# Patient Record
Sex: FEMALE | Race: WHITE | NOT HISPANIC OR LATINO | Employment: UNEMPLOYED | URBAN - NONMETROPOLITAN AREA
[De-identification: names, ages, dates, MRNs, and addresses within clinical notes are randomized per-mention and may not be internally consistent; named-entity substitution may affect disease eponyms.]

---

## 2022-06-14 ENCOUNTER — OFFICE VISIT (OUTPATIENT)
Dept: PSYCHIATRY | Facility: CLINIC | Age: 32
End: 2022-06-14

## 2022-06-14 VITALS
SYSTOLIC BLOOD PRESSURE: 118 MMHG | WEIGHT: 149 LBS | BODY MASS INDEX: 27.42 KG/M2 | HEIGHT: 62 IN | HEART RATE: 68 BPM | DIASTOLIC BLOOD PRESSURE: 78 MMHG

## 2022-06-14 DIAGNOSIS — F41.1 GENERALIZED ANXIETY DISORDER: ICD-10-CM

## 2022-06-14 DIAGNOSIS — F33.1 MODERATE EPISODE OF RECURRENT MAJOR DEPRESSIVE DISORDER: Primary | ICD-10-CM

## 2022-06-14 DIAGNOSIS — G47.09 OTHER INSOMNIA: ICD-10-CM

## 2022-06-14 PROCEDURE — 90792 PSYCH DIAG EVAL W/MED SRVCS: CPT | Performed by: NURSE PRACTITIONER

## 2022-06-14 RX ORDER — PRAMIPEXOLE DIHYDROCHLORIDE 0.25 MG/1
0.25 TABLET ORAL
COMMUNITY
Start: 2022-05-28 | End: 2022-08-22

## 2022-06-14 RX ORDER — HYDROCHLOROTHIAZIDE 12.5 MG/1
12.5 CAPSULE, GELATIN COATED ORAL DAILY
COMMUNITY
End: 2022-08-22

## 2022-06-14 RX ORDER — IBUPROFEN 800 MG/1
TABLET ORAL
COMMUNITY
Start: 2022-05-30

## 2022-06-14 RX ORDER — DULOXETIN HYDROCHLORIDE 60 MG/1
120 CAPSULE, DELAYED RELEASE ORAL DAILY
Qty: 60 CAPSULE | Refills: 2 | Status: SHIPPED | OUTPATIENT
Start: 2022-06-14

## 2022-06-14 RX ORDER — HYOSCYAMINE SULFATE 0.12 MG/5ML
125 LIQUID ORAL EVERY 4 HOURS PRN
COMMUNITY

## 2022-06-14 RX ORDER — LORATADINE 10 MG/1
10 TABLET ORAL DAILY
COMMUNITY
Start: 2022-05-25 | End: 2022-07-19 | Stop reason: ALTCHOICE

## 2022-06-14 RX ORDER — CLONIDINE HYDROCHLORIDE 0.1 MG/1
0.1 TABLET ORAL NIGHTLY
Qty: 30 TABLET | Refills: 2 | Status: SHIPPED | OUTPATIENT
Start: 2022-06-14 | End: 2022-07-19 | Stop reason: SDUPTHER

## 2022-06-14 RX ORDER — DULOXETIN HYDROCHLORIDE 30 MG/1
30 CAPSULE, DELAYED RELEASE ORAL DAILY
COMMUNITY
Start: 2022-04-26 | End: 2022-06-14 | Stop reason: DRUGHIGH

## 2022-06-14 RX ORDER — PROPRANOLOL HYDROCHLORIDE 10 MG/1
10 TABLET ORAL 2 TIMES DAILY
COMMUNITY
Start: 2022-03-31 | End: 2022-06-14 | Stop reason: SINTOL

## 2022-06-14 RX ORDER — DULOXETIN HYDROCHLORIDE 60 MG/1
60 CAPSULE, DELAYED RELEASE ORAL DAILY
COMMUNITY
Start: 2022-06-01 | End: 2022-06-14 | Stop reason: SDUPTHER

## 2022-06-14 RX ORDER — DIVALPROEX SODIUM 250 MG/1
750 TABLET, EXTENDED RELEASE ORAL
COMMUNITY
Start: 2022-05-24

## 2022-06-14 RX ORDER — CARBAMAZEPINE 100 MG/1
100 CAPSULE, EXTENDED RELEASE ORAL EVERY 12 HOURS
COMMUNITY
Start: 2022-05-27
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
## 2022-06-22 ENCOUNTER — TELEPHONE (OUTPATIENT)
Dept: PSYCHIATRY | Facility: CLINIC | Age: 32
End: 2022-06-22

## 2022-06-22 NOTE — TELEPHONE ENCOUNTER
Ruby called and said that you had referred her to neurology, but that she has her EEG paperwork from the beginning of January and February that she can bring. She wanted to know if she still needed to be seen by neurology? Thanks!

## 2022-07-19 ENCOUNTER — OFFICE VISIT (OUTPATIENT)
Dept: PSYCHIATRY | Facility: CLINIC | Age: 32
End: 2022-07-19

## 2022-07-19 VITALS
WEIGHT: 138.5 LBS | DIASTOLIC BLOOD PRESSURE: 68 MMHG | BODY MASS INDEX: 25.49 KG/M2 | SYSTOLIC BLOOD PRESSURE: 114 MMHG | HEART RATE: 86 BPM | HEIGHT: 62 IN

## 2022-07-19 DIAGNOSIS — F33.1 MODERATE EPISODE OF RECURRENT MAJOR DEPRESSIVE DISORDER: Primary | Chronic | ICD-10-CM

## 2022-07-19 DIAGNOSIS — G47.09 OTHER INSOMNIA: Chronic | ICD-10-CM

## 2022-07-19 DIAGNOSIS — F41.1 GENERALIZED ANXIETY DISORDER: Chronic | ICD-10-CM

## 2022-07-19 PROCEDURE — 99214 OFFICE O/P EST MOD 30 MIN: CPT | Performed by: NURSE PRACTITIONER

## 2022-07-19 RX ORDER — CLONIDINE HYDROCHLORIDE 0.1 MG/1
0.2 TABLET ORAL NIGHTLY
Qty: 60 TABLET | Refills: 2 | Status: SHIPPED | OUTPATIENT
Start: 2022-07-19

## 2022-07-19 RX ORDER — ADAPALENE 0.3 %
GEL (GRAM) TOPICAL
COMMUNITY
Start: 2022-06-21

## 2022-07-19 RX ORDER — LEVOCETIRIZINE DIHYDROCHLORIDE 5 MG
5 TABLET ORAL EVERY EVENING
COMMUNITY
Start: 2022-06-16

## 2022-07-19 RX ORDER — POLYETHYLENE GLYCOL 3350 17 G
POWDER IN PACKET (EA) ORAL
COMMUNITY
Start: 2022-06-16

## 2022-07-19 RX ORDER — DOXYCYCLINE HYCLATE 50 MG/1
50 CAPSULE ORAL EVERY 12 HOURS
COMMUNITY
Start: 2022-07-13

## 2022-07-19 RX ORDER — NALTREXONE HYDROCHLORIDE 50 MG/1
50 TABLET, FILM COATED ORAL DAILY
COMMUNITY
Start: 2022-06-20

## 2022-07-19 NOTE — PROGRESS NOTES
"Chief Complaint  Anxiety, Depression, and Sleeping Problem    Subjective          Ruby Barakat presents to [facility] for medication management of her anxiety, depression, sleeping difficulties.    History of Present Illness: Patient presents today for follow-up appointment after last being seen for initial evaluation on 06/14/2022.  Patient reports today \"I am doing all right\".  She says the increase in Cymbalta at her last appointment has been very beneficial, however she sometimes still feels \"off\".  She reports she still can have frequent mood swings, and go from periods where she feels she is doing well, to suddenly feeling that things are not going okay.  Patient says she can be frustrated with this, but feels it is largely due to her environment.  Patient reports she is thinking about switching to a different rehabilitation facility.  She is not court ordered to be at [facility], and feels another facility may be better for her overall.  Patient says she is looking into either [facility] or [facility].  Patient says the clonidine has also been beneficial for her sleep, but says she is still waking up multiple times a night.  Patient has continued to take her other medications and has an upcoming appointment with neurology next month.  She is looking forward to that appointment because she does not want to continue taking epilepsy medication if she does not need to.  Patient reports her appetite has been good and she has no concerns.  Patient denies any SI/HI, A/V hallucinations.    Current Medications:   Current Outpatient Medications   Medication Sig Dispense Refill   • carBAMazepine (CARBATROL) 100 MG 12 hr capsule Take 100 mg by mouth Every 12 (Twelve) Hours.     • cloNIDine (Catapres) 0.1 MG tablet Take 2 tablets by mouth Every Night. 60 tablet 2   • [employer] Allergy Relief 5 MG tablet Take 5 mg by mouth Every Evening.     • Differin 0.3 % gel APPLY BY TOPICAL ROUTE EVERY DAY " "A THIN LAYER TO THE AFFECTED AREA(S) AFTER WASHING IN THE EVENING     • divalproex (DEPAKOTE ER) 250 MG 24 hr tablet Take 750 mg by mouth every night at bedtime.     • doxycycline (VIBRAMYCIN) 50 MG capsule Take 50 mg by mouth Every 12 (Twelve) Hours.     • DULoxetine (CYMBALTA) 60 MG capsule Take 2 capsules by mouth Daily. 60 capsule 2   • hydroCHLOROthiazide (MICROZIDE) 12.5 MG capsule Take 12.5 mg by mouth Daily.     • hyoscyamine (LEVSIN) 0.125 MG/5ML elixir Take 125 mcg by mouth Every 4 (Four) Hours As Needed for Bladder Spasms.     • ibuprofen (ADVIL,MOTRIN) 800 MG tablet TAKE 1 TABLET BY MOUTH TWICE A DAY WITH FOOD AS NEEDED     • naltrexone (DEPADE) 50 MG tablet Take 50 mg by mouth Daily.     • pramipexole (MIRAPEX) 0.25 MG tablet Take 0.25 mg by mouth every night at bedtime.     • nicotine polacrilex (COMMIT) 4 MG lozenge DISSOLVE 1 LOZENGE BY MOUTH EVERY 2-4 HOURS AS NEEDED       No current facility-administered medications for this visit.       Mental Status Exam:   Hygiene:   good  Cooperation:  Cooperative  Eye Contact:  Good  Psychomotor Behavior:  Appropriate  Affect:  Appropriate  Mood: euthymic  Speech:  Normal  Thought Process:  Goal directed  Thought Content:  Mood congruent  Suicidal:  None  Homicidal:  None  Hallucinations:  None  Delusion:  None  Memory:  Intact  Orientation:  Person, Place, Time and Situation  Reliability:  good  Insight:  Good  Judgement:  Good  Impulse Control:  Good  Physical/Medical Issues:  RLS, possible seizure disorder       Objective   Vital Signs:   /68   Pulse 86   Ht 157.5 cm (62\")   Wt 62.8 kg (138 lb 8 oz)   BMI 25.33 kg/m²     Physical Exam  Neurological:      Mental Status: She is oriented to person, place, and time. Mental status is at baseline.      Coordination: Coordination is intact.      Gait: Gait is intact.   Psychiatric:         Behavior: Behavior is cooperative.        Result Review :     The following data was reviewed by: Adele Montes De Oca, " APRN on 07/19/2022:    Data reviewed: Previous note, medication history          Assessment and Plan    Problem List Items Addressed This Visit    None     Visit Diagnoses     Moderate episode of recurrent major depressive disorder (HCC)  (Chronic)   -  Primary    Improving    Relevant Medications    nicotine polacrilex (COMMIT) 4 MG lozenge    Generalized anxiety disorder  (Chronic)       Improving    Relevant Medications    nicotine polacrilex (COMMIT) 4 MG lozenge    Other insomnia  (Chronic)       Unchanged    Relevant Medications    cloNIDine (Catapres) 0.1 MG tablet          PHQ-9 Score:   PHQ-9 Total Score: 13      Depression Screening:  Patient screened positive for depression based on a PHQ-9 score of 13 on 7/19/2022. Follow-up recommendations include: Prescribed antidepressant medication treatment and Suicide Risk Assessment performed.        Tobacco Cessation:  Ruby Barakat  reports that she has been smoking cigarettes. She has been smoking about 0.25 packs per day. She has never used smokeless tobacco.. I have educated her on the risk of diseases from using tobacco products such as cancer, COPD and heart disease.     I advised her to quit and she is not willing to quit.    I spent 3  minutes counseling the patient.       Impression/Plan:  -This is my first follow-up appointment with patient.  Patient presents today and reports she has been doing better than she was at her initial evaluation.  She feels the medication changes made at her previous appointment have been beneficial to her.  She endorses taking her medication as prescribed, and denies any adverse effects associated with them.  Patient feels while she is doing better overall, she does continue to struggle, especially with sleep.  Discussed increasing her clonidine and the patient would be open to this.  -Maintain Cymbalta 120 mg daily.  -Increase clonidine to 0.2 mg nightly.  -Schedule follow-up appointment for 2 months or as needed.    MEDS  ORDERED DURING VISIT:  New Medications Ordered This Visit   Medications   • cloNIDine (Catapres) 0.1 MG tablet     Sig: Take 2 tablets by mouth Every Night.     Dispense:  60 tablet     Refill:  2         Follow Up   Return in about 2 months (around 9/19/2022), or if symptoms worsen or fail to improve, for Next scheduled follow up.  Patient was given instructions and counseling regarding her condition or for health maintenance advice. Please see specific information pulled into the AVS if appropriate.       TREATMENT PLAN/GOALS: Continue supportive psychotherapy efforts and medications as indicated. Treatment and medication options discussed during today's visit. Patient acknowledged and verbally consented to continue with current treatment plan and was educated on the importance of compliance with treatment and follow-up appointments.    MEDICATION ISSUES:  Discussed medication options and treatment plan of prescribed medication as well as the risks, benefits, and side effects including potential falls, possible impaired driving and metabolic adversities among others. Patient is agreeable to call the office with any worsening of symptoms or onset of side effects. Patient is agreeable to call 911 or go to the nearest ER should he/she begin having SI/HI.          This document has been electronically signed by VIRGILIO Acevedo, PMHNP-BC  July 19, 2022 14:56 EDT      Part of this note may be an electronic transcription/translation of spoken language to printed text using the Dragon Dictation System.

## 2022-08-04 ENCOUNTER — TELEPHONE (OUTPATIENT)
Dept: PSYCHIATRY | Facility: CLINIC | Age: 32
End: 2022-08-04

## 2022-08-04 NOTE — TELEPHONE ENCOUNTER
Ruby states she is no longer staying at [facility]. States it was discussed that mediation's while staying at [facility] was limited. Now that she is no longer staying there she would like to know if you would prescribe her medication for sleep and anxiety. Please advise

## 2022-08-22 ENCOUNTER — OFFICE VISIT (OUTPATIENT)
Dept: NEUROLOGY | Facility: CLINIC | Age: 32
End: 2022-08-22

## 2022-08-22 VITALS
HEART RATE: 105 BPM | BODY MASS INDEX: 26.87 KG/M2 | TEMPERATURE: 97.8 F | HEIGHT: 62 IN | DIASTOLIC BLOOD PRESSURE: 80 MMHG | WEIGHT: 146 LBS | SYSTOLIC BLOOD PRESSURE: 140 MMHG | OXYGEN SATURATION: 95 %

## 2022-08-22 DIAGNOSIS — F10.11 HISTORY OF ALCOHOL ABUSE: ICD-10-CM

## 2022-08-22 DIAGNOSIS — G62.89 OTHER POLYNEUROPATHY: ICD-10-CM

## 2022-08-22 DIAGNOSIS — F39 MOOD DISORDER: ICD-10-CM

## 2022-08-22 DIAGNOSIS — R56.9 WITNESSED SEIZURE-LIKE ACTIVITY: Primary | ICD-10-CM

## 2022-08-22 PROCEDURE — 99214 OFFICE O/P EST MOD 30 MIN: CPT | Performed by: NURSE PRACTITIONER

## 2022-08-22 RX ORDER — PREGABALIN 50 MG/1
50 CAPSULE ORAL 3 TIMES DAILY
Qty: 90 CAPSULE | Refills: 1 | Status: SHIPPED | OUTPATIENT
Start: 2022-08-22

## 2022-08-22 RX ORDER — CYCLOBENZAPRINE HCL 10 MG
TABLET ORAL
COMMUNITY
Start: 2022-07-24

## 2022-08-22 RX ORDER — PREGABALIN 50 MG/1
50 CAPSULE ORAL 3 TIMES DAILY
Qty: 90 CAPSULE | Refills: 1 | Status: SHIPPED | OUTPATIENT
Start: 2022-08-22 | End: 2022-08-22 | Stop reason: SDUPTHER

## 2022-08-22 RX ORDER — BUSPIRONE HYDROCHLORIDE 5 MG/1
5 TABLET ORAL 2 TIMES DAILY
COMMUNITY
Start: 2022-08-11

## 2022-08-22 NOTE — PROGRESS NOTES
New Patient Office Visit      Patient Name: Ruby Barakat  : 1990   MRN: [id number]     Referring Physician: Adele Montes De Oca A*    Chief Complaint:    Chief Complaint   Patient presents with   • Consult     Np, in office to establish care for seizures. Patient states her first sz was in January the last seizure she had was in February. Patient states she thinks they were alcohol induced seizures.        History of Present Illness: Ruby Barakat is a 32 y.o. female who is here today to establish care with Neurology for seizures.  She had seizure-like activity when she was inpatient and withdrawing from alcohol.  She denies any history of seizure disorder.  She is taking Depakote ER 750mg QHS and Carbamazepine 100mg BID- reports good compliance and toleration. She says she was prescribed these medications while she was inpatient.  She denies any history of head injury.  She did not graduate from High School but did rodrigo her GED.  She was in regular classes while in High School.  She was the product of a normal, full-term birth.  Additional risk factors- BMI 26, mood disorder, pituitary tumor removal , chronic alcohol use for 16 years- sober for 6 months.     Peripheral neuropathy- Diagnosed with this a couple of years ago.  Has sharp-burning pains, numbness and tingling in her feet and legs.  Symptoms were much worse when she was drinking heavily.  She was previously on Lyrica for her symptoms and it did help.  On 2022 vitamin B12 and folate were normal.     Pertinent Medical History:  On 2017 MRI brain with and without contrast showed-   Surgical changes status post transsphenoidal pituitary resection. The infundibulum is midline and is not thickened. There is residual normal enhancing pituitary tissue along the dorsal and lateral aspects of the sella. There is no definite evidence of hypoenhancing residual tumor. The optic chiasm, cavernous sinuses, and hypothalamus are unremarkable.      There is no abnormal brain parenchymal signal. There is no abnormal intra-axial postcontrast enhancement. The ventricles are within normal limits. The basilar cisterns are patent. There is no cerebellar tonsillar herniation.     Diffusion imaging shows no evidence of acute infarction or other acute abnormality.     There are no suspicious osseous lesions.    Subjective      Review of Systems:   Review of Systems   Constitutional: Negative for fatigue, fever, unexpected weight gain and unexpected weight loss.   HENT: Positive for sneezing. Negative for hearing loss, sore throat, swollen glands, tinnitus and trouble swallowing.    Eyes: Negative for blurred vision, double vision, photophobia and visual disturbance.   Respiratory: Negative for cough, chest tightness and shortness of breath.    Cardiovascular: Negative for chest pain, palpitations and leg swelling.   Gastrointestinal: Positive for abdominal pain. Negative for constipation, diarrhea and nausea.   Endocrine: Negative for cold intolerance and heat intolerance.   Musculoskeletal: Negative for gait problem, neck pain and neck stiffness.   Skin: Negative for color change and rash.   Allergic/Immunologic: Negative for environmental allergies and food allergies.   Neurological: Positive for seizures and numbness. Negative for dizziness, syncope, facial asymmetry, speech difficulty, weakness, headache, memory problem and confusion.   Psychiatric/Behavioral: Negative for agitation, behavioral problems and depressed mood. The patient is not nervous/anxious.        Past Medical History:   Past Medical History:   Diagnosis Date   • Alcohol abuse    • Anxiety    • Depression        Past Surgical History:   Past Surgical History:   Procedure Laterality Date   • BRAIN TUMOR EXCISION N/A 2006       Family History:   Family History   Problem Relation Age of Onset   • Alcohol abuse Maternal Grandfather        Social History:   Social History     Socioeconomic History    • Marital status: Single   Tobacco Use   • Smoking status: Former Smoker     Packs/day: 0.25     Types: Cigarettes     Quit date: 2022     Years since quittin.0   • Smokeless tobacco: Never Used   Vaping Use   • Vaping Use: Every day   • Start date: 2022   • Substances: Nicotine   • Devices: RefThe Influenceble tank   Substance and Sexual Activity   • Alcohol use: Not Currently   • Drug use: Not Currently     Types: Marijuana, LSD, Other     Comment: extasy, oscar   • Sexual activity: Defer       Medications:     Current Outpatient Medications:   •  busPIRone (BUSPAR) 5 MG tablet, Take 5 mg by mouth 2 (Two) Times a Day., Disp: , Rfl:   •  carBAMazepine (CARBATROL) 100 MG 12 hr capsule, Take 100 mg by mouth Every 12 (Twelve) Hours., Disp: , Rfl:   •  [employer] Allergy Relief 5 MG tablet, Take 5 mg by mouth Every Evening., Disp: , Rfl:   •  cyclobenzaprine (FLEXERIL) 10 MG tablet, TAKE ONE-HALF TO ONE TABLET BY MOUTH TWICE DAILY - DOCTOR NOT COVERED, Disp: , Rfl:   •  Differin 0.3 % gel, APPLY BY TOPICAL ROUTE EVERY DAY A THIN LAYER TO THE AFFECTED AREA(S) AFTER WASHING IN THE EVENING, Disp: , Rfl:   •  divalproex (DEPAKOTE ER) 250 MG 24 hr tablet, Take 750 mg by mouth every night at bedtime., Disp: , Rfl:   •  doxycycline (VIBRAMYCIN) 50 MG capsule, Take 50 mg by mouth Every 12 (Twelve) Hours., Disp: , Rfl:   •  DULoxetine (CYMBALTA) 60 MG capsule, Take 2 capsules by mouth Daily., Disp: 60 capsule, Rfl: 2  •  hyoscyamine (LEVSIN) 0.125 MG/5ML elixir, Take 125 mcg by mouth Every 4 (Four) Hours As Needed for Bladder Spasms., Disp: , Rfl:   •  ibuprofen (ADVIL,MOTRIN) 800 MG tablet, TAKE 1 TABLET BY MOUTH TWICE A DAY WITH FOOD AS NEEDED, Disp: , Rfl:   •  naltrexone (DEPADE) 50 MG tablet, Take 50 mg by mouth Daily., Disp: , Rfl:   •  nicotine polacrilex (COMMIT) 4 MG lozenge, DISSOLVE 1 LOZENGE BY MOUTH EVERY 2-4 HOURS AS NEEDED, Disp: , Rfl:   •  pregabalin (Lyrica) 50 MG capsule, Take 1 capsule by mouth 3 (Three)  "Times a Day., Disp: 90 capsule, Rfl: 1  •  cloNIDine (Catapres) 0.1 MG tablet, Take 2 tablets by mouth Every Night., Disp: 60 tablet, Rfl: 2    Allergies:   Allergies   Allergen Reactions   • Demerol [Meperidine] Hives       Objective     Physical Exam:  Vital Signs:   Vitals:    08/22/22 1122   BP: 140/80   BP Location: Right arm   Patient Position: Sitting   Cuff Size: Adult   Pulse: 105   Temp: 97.8 °F (36.6 °C)   SpO2: 95%   Weight: 66.2 kg (146 lb)   Height: 157.5 cm (62\")   PainSc:   7   PainLoc: Leg  Comment: both legs from the knee down     Body mass index is 26.7 kg/m².     Physical Exam  Vitals and nursing note reviewed.   Constitutional:       General: She is not in acute distress.     Appearance: Normal appearance. She is well-developed and normal weight. She is not diaphoretic.   HENT:      Head: Normocephalic and atraumatic.   Eyes:      Extraocular Movements: Extraocular movements intact.      Conjunctiva/sclera: Conjunctivae normal.      Pupils: Pupils are equal, round, and reactive to light.   Cardiovascular:      Rate and Rhythm: Normal rate and regular rhythm.      Heart sounds: Normal heart sounds. No murmur heard.    No friction rub. No gallop.   Pulmonary:      Effort: Pulmonary effort is normal. No respiratory distress.      Breath sounds: Normal breath sounds. No wheezing or rales.   Musculoskeletal:         General: Normal range of motion.   Skin:     General: Skin is warm and dry.      Findings: No rash.   Neurological:      General: No focal deficit present.      Mental Status: She is alert and oriented to person, place, and time.      Coordination: Finger-Nose-Finger Test normal.      Gait: Gait is intact.   Psychiatric:         Mood and Affect: Mood normal.         Speech: Speech normal.         Behavior: Behavior normal.         Thought Content: Thought content normal.         Judgment: Judgment normal.         Neurologic Exam     Mental Status   Oriented to person, place, and time. "   Attention: normal. Concentration: normal.   Speech: speech is normal   Level of consciousness: alert  Knowledge: good.     Cranial Nerves   Cranial nerves II through XII intact.     CN II   Visual fields full to confrontation.     CN III, IV, VI   Pupils are equal, round, and reactive to light.  Right pupil: Size: 3 mm. Shape: regular. Reactivity: brisk.   Left pupil: Size: 3 mm. Shape: regular. Reactivity: brisk.   CN III: no CN III palsy  CN VI: no CN VI palsy  Nystagmus: none     CN V   Facial sensation intact.     CN VII   Facial expression full, symmetric.     CN VIII   CN VIII normal.     CN IX, X   CN IX normal.   CN X normal.     CN XI   CN XI normal.     CN XII   CN XII normal.     Motor Exam   Muscle bulk: normal  Overall muscle tone: normal    Strength   Right biceps: 5/5  Left biceps: 5/5  Right triceps: 5/5  Left triceps: 5/5  Right quadriceps: 5/5  Left quadriceps: 5/5  Right hamstrin/5  Left hamstrin/5    Sensory Exam   Light touch normal.   Proprioception normal.     Gait, Coordination, and Reflexes     Gait  Gait: normal    Coordination   Finger to nose coordination: normal    Tremor   Resting tremor: absent  Intention tremor: absent  Action tremor: absent      Assessment / Plan      Assessment/Plan:   Diagnoses and all orders for this visit:    1. Witnessed seizure-like activity (HCC) (Primary)  -     EEG; Future  -     MRI Brain With & Without Contrast; Future    2. History of alcohol abuse    3. Other polyneuropathy  -     Discontinue: pregabalin (Lyrica) 50 MG capsule; Take 1 capsule by mouth 3 (Three) Times a Day.  Dispense: 90 capsule; Refill: 1  -     pregabalin (Lyrica) 50 MG capsule; Take 1 capsule by mouth 3 (Three) Times a Day.  Dispense: 90 capsule; Refill: 1    4. Mood disorder (HCC)    5. BMI 26.0-26.9,adult    *Education on seizures and peripheral neuropathy provided today. 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   *I have advised patient no driving for  90 days following a seizure per [place] laws.   *Seizure precautions discussed.  Family and friends are to call 911 for any seizure activity lasting more than 5 minutes.   *Indications and possible SEs of Lyrica discussed. CSA signed and Marty reviewed.     Follow Up:   Return in about 6 weeks (around 10/3/2022) for Follow Up.    VIRGILIO Mims, FNP-C  [facility]       Please note that portions of this note may have been completed with a voice recognition program. Efforts were made to edit the dictations, but occasionally words are mistranscribed.

## 2022-08-26 ENCOUNTER — TELEPHONE (OUTPATIENT)
Dept: NEUROLOGY | Facility: CLINIC | Age: 32
End: 2022-08-26

## 2022-08-26 DIAGNOSIS — G62.89 OTHER POLYNEUROPATHY: ICD-10-CM

## 2022-08-26 RX ORDER — PREGABALIN 50 MG/1
50 CAPSULE ORAL 3 TIMES DAILY
Qty: 90 CAPSULE | Refills: 1 | Status: CANCELLED | OUTPATIENT
Start: 2022-08-26

## 2022-08-26 NOTE — TELEPHONE ENCOUNTER
Caller: TONY Mitchell call back number: [phone]    Requested Prescriptions:   LYRICA 50 MG     Requested Prescriptions      No prescriptions requested or ordered in this encounter        Pharmacy where request should be sent:  [facility] [address] [place] [address] - [phone] [facility] [phone]   [phone]    Additional details provided by patient: PT SAID PHARMACY TOLD HER RX NEEDS A P.A .     Does the patient have less than a 3 day supply:  [x] Yes  [] No    PLEASE ADVISE     Laureen CABRAL Rep   08/26/22 15:36 EDT

## 2022-09-22 ENCOUNTER — HOSPITAL ENCOUNTER (OUTPATIENT)
Dept: SLEEP MEDICINE | Facility: HOSPITAL | Age: 32
Discharge: HOME | End: 2022-09-22
Admitting: NURSE PRACTITIONER

## 2022-09-22 DIAGNOSIS — R56.9 WITNESSED SEIZURE-LIKE ACTIVITY: ICD-10-CM

## 2022-09-22 PROCEDURE — 95816 EEG AWAKE AND DROWSY: CPT

## 2022-09-28 ENCOUNTER — HOSPITAL ENCOUNTER (OUTPATIENT)
Dept: MRI IMAGING | Facility: HOSPITAL | Age: 32
Discharge: HOME | End: 2022-09-28
Admitting: NURSE PRACTITIONER

## 2022-09-28 DIAGNOSIS — R56.9 WITNESSED SEIZURE-LIKE ACTIVITY: ICD-10-CM

## 2022-09-28 PROCEDURE — 70553 MRI BRAIN STEM W/O & W/DYE: CPT

## 2022-09-28 PROCEDURE — A9577 INJ MULTIHANCE: HCPCS | Performed by: NURSE PRACTITIONER

## 2022-09-28 PROCEDURE — 0 GADOBENATE DIMEGLUMINE 529 MG/ML SOLUTION: Performed by: NURSE PRACTITIONER

## 2022-09-28 RX ADMIN — GADOBENATE DIMEGLUMINE 15 ML: 529 INJECTION, SOLUTION INTRAVENOUS at 09:34

## 2022-10-03 ENCOUNTER — TELEPHONE (OUTPATIENT)
Dept: NEUROLOGY | Facility: CLINIC | Age: 32
End: 2022-10-03

## 2022-10-03 NOTE — TELEPHONE ENCOUNTER
Provider: MARIA TERESA COTE APRN    Caller: TONY    Relationship to Patient: SELF    Phone Number: [phone]    Reason for Call: PT CALLED REGARDING A MISSED CALL.   R/S APPT FROM 10-6-22 D/T PROVIDER OUT OF THE OFFICE WITH NEW DATE OF 12-8-22.  PT WOULD LIKE TO KNOW THE RESULTS OF HER MRI & EEG TESTS BEFORE THE DECEMBER APPT.    PLEASE CALL & ADVISE

## 2022-10-03 NOTE — TELEPHONE ENCOUNTER
Her MRI brain showed no acute intracranial abnormality and her EEG was normal. I need to see her before December because I prescribed a controlled substance for her. Could be squeeze her in somewhere in the next couple of weeks?

## 2023-09-28 ENCOUNTER — TELEPHONE (OUTPATIENT)
Dept: NEUROLOGY | Facility: CLINIC | Age: 33
End: 2023-09-28
Payer: MEDICAID

## 2023-09-28 NOTE — TELEPHONE ENCOUNTER
Provider: BALWINDER    Caller: TONY    Pharmacy: [facility]     Address: [address] , [place] [address]    Phone Number: [phone]     Reason for Call: PT CALLED AND IS WANTING TO KNOW IF THERE IS ANYTHING THAT CAN BE CALLED IN FOR NEUROPATHY BEFORE APPT. PT STATES THAT THE PAIN IS WORSE IN BOTH LEGS FROM STANDING AND WORKING LONG SHIFTS. PT STATES THAT SHE USE TO GABAPENTIN AND LYRICA AT DIFFERENT TIMES AND THEY BOTH SEEMED TO HELP.    PLEASE REVIEW AND ADVISE.  THANK YOU

## 2023-09-28 NOTE — TELEPHONE ENCOUNTER
Sorry I didn't clarify. But, yes, I think she could get in sooner with Nuria or Yasmeen. I don't want to prescribe a new medication without a follow up visit to discuss further. Thanks.

## 2023-09-28 NOTE — TELEPHONE ENCOUNTER
"Patient was asking if \"THERE IS ANYTHING THAT CAN BE CALLED IN FOR NEUROPATHY BEFORE APPT. PT STATES THAT THE PAIN IS WORSE IN BOTH LEGS FROM STANDING AND WORKING LONG SHIFTS. PT STATES THAT SHE USE TO GABAPENTIN AND LYRICA AT DIFFERENT TIMES AND THEY BOTH SEEMED TO HELP.\"    Are you wanting her to be seen sooner than what she's scheduled for with you. Which is 10/23/2023.  "

## 2023-09-29 ENCOUNTER — OFFICE VISIT (OUTPATIENT)
Dept: NEUROLOGY | Facility: CLINIC | Age: 33
End: 2023-09-29
Payer: MEDICAID

## 2023-09-29 VITALS
OXYGEN SATURATION: 99 % | SYSTOLIC BLOOD PRESSURE: 118 MMHG | RESPIRATION RATE: 14 BRPM | HEIGHT: 63 IN | WEIGHT: 147.4 LBS | HEART RATE: 103 BPM | DIASTOLIC BLOOD PRESSURE: 70 MMHG | BODY MASS INDEX: 26.12 KG/M2

## 2023-09-29 DIAGNOSIS — G62.9 POLYNEUROPATHY: Primary | ICD-10-CM

## 2023-09-29 DIAGNOSIS — F10.10 ALCOHOL ABUSE: ICD-10-CM

## 2023-09-29 RX ORDER — PREGABALIN 75 MG/1
75 CAPSULE ORAL 2 TIMES DAILY
Qty: 60 CAPSULE | Refills: 2 | Status: SHIPPED | OUTPATIENT
Start: 2023-09-29

## 2023-09-29 NOTE — PROGRESS NOTES
Follow Up Office Visit      Patient Name: Ruby Barakat  : 1990   MRN: [id number]     Chief Complaint:    Chief Complaint   Patient presents with    Follow-up     Patient is in office to follow-up on neuropathy.        History of Present Illness: Ruby Barakat is a 33 y.o. female who is here today to follow up with Neurology for peripheral neuropathy. She is an established pt of Neurology  ([facility]) with last encounter  for seizure activity with EEG. Seizure Disorder was ruled out. Wellbutrin caused seizures and has been DCd.     Today, she co peripheral neuropathy that began 2020 to BLE. She has sharp burning pains and tingling numbness from knees to toes in both feet. Symptoms were in hands and this has resolved. In  she was started on gabapentin and this was changed to Lyrica (works better). Quit drinking and neuropathy improvement in her hands. Established pt of Counseling ([facility] in [place]) for ETOH recovery counseling.     SOCIAL: Single. Works FT at [employer]. Former . Moved to [place]  for rehabilitation for ETOH. 20 glasses of wine per day; sobriety date 22.     Recent Imaging:    MRI Brain W/WO  + Slight volume loss in the left hippocampus with slightly more prominent left temporal horn of the lateral ventricle relative to the right.  EEG  -contributory    Pertinent Medical History: Pituitary Tumor Removed , Mood Disorder, BMI > 30, ETOH Abuse    Subjective      Review of Systems:   Review of Systems   Constitutional: Negative.    HENT: Negative.     Eyes: Negative.    Respiratory: Negative.     Cardiovascular: Negative.    Gastrointestinal: Negative.    Endocrine: Negative.    Genitourinary: Negative.    Musculoskeletal: Negative.    Skin: Negative.    Allergic/Immunologic: Negative.    Neurological:  Positive for numbness. Negative for weakness.   Hematological: Negative.    Psychiatric/Behavioral: Negative.     All other  "systems reviewed and are negative.    I have reviewed and the following portions of the patient's history were updated as appropriate: past family history, past medical history, past social history, past surgical history and problem list.    Medications:     Current Outpatient Medications:     albuterol sulfate  (90 Base) MCG/ACT inhaler, Inhale 2 puffs Every 4 (Four) Hours As Needed for Wheezing., Disp: 18 g, Rfl: 0    mometasone-formoterol (DULERA 100) 100-5 MCG/ACT inhaler, Inhale 2 puffs 2 (Two) Times a Day., Disp: 1 each, Rfl: 0    Nirmatrelvir&Ritonavir 300/100 (PAXLOVID) 20 x 150 MG & 10 x 100MG tablet therapy pack tablet, Take 3 tablets by mouth 2 (Two) Times a Day., Disp: 30 tablet, Rfl: 0    ondansetron ODT (ZOFRAN-ODT) 4 MG disintegrating tablet, Place 1 tablet under the tongue Every 8 (Eight) Hours As Needed for Nausea or Vomiting., Disp: 9 tablet, Rfl: 0    predniSONE (DELTASONE) 20 MG tablet, 3 po daily for 2 days, 2 po daily for 2 days, 1 po daily for 2 days, Disp: 12 tablet, Rfl: 0    pregabalin (Lyrica) 75 MG capsule, Take 1 capsule by mouth 2 (Two) Times a Day., Disp: 60 capsule, Rfl: 2    Allergies:   Allergies   Allergen Reactions    Demerol [Meperidine] Hives       Objective     Physical Exam:  Vital Signs:   Vitals:    09/29/23 0857   BP: 118/70   BP Location: Left arm   Patient Position: Sitting   Cuff Size: Adult   Pulse: 103   Resp: 14   SpO2: 99%   Weight: 66.9 kg (147 lb 6.4 oz)   Height: 160 cm (63\")   PainSc:   5   PainLoc: Leg     Body mass index is 26.11 kg/m².    Physical Exam  Vitals and nursing note reviewed.   Constitutional:       General: She is not in acute distress.     Appearance: Normal appearance. She is normal weight.   HENT:      Head: Normocephalic.      Nose: Nose normal.      Mouth/Throat:      Mouth: Mucous membranes are moist.      Pharynx: Oropharynx is clear.   Eyes:      Extraocular Movements: Extraocular movements intact.      Conjunctiva/sclera: " Conjunctivae normal.      Pupils: Pupils are equal, round, and reactive to light.   Cardiovascular:      Rate and Rhythm: Normal rate and regular rhythm.      Pulses: Normal pulses.      Heart sounds: Normal heart sounds.   Pulmonary:      Effort: Pulmonary effort is normal.      Breath sounds: Normal breath sounds.   Musculoskeletal:         General: Normal range of motion.      Cervical back: Normal range of motion and neck supple. No rigidity.        Feet:    Skin:     General: Skin is warm and dry.      Capillary Refill: Capillary refill takes less than 2 seconds.   Neurological:      General: No focal deficit present.      Mental Status: She is alert and oriented to person, place, and time.      Cranial Nerves: Cranial nerves 2-12 are intact.      Coordination: Finger-Nose-Finger Test and Romberg Test normal.      Gait: Gait is intact.      Deep Tendon Reflexes:      Reflex Scores:       Tricep reflexes are 2+ on the right side and 2+ on the left side.       Bicep reflexes are 2+ on the right side and 2+ on the left side.       Patellar reflexes are 2+ on the right side and 2+ on the left side.  Psychiatric:         Mood and Affect: Mood normal.         Speech: Speech normal.         Behavior: Behavior normal.         Thought Content: Thought content normal.         Judgment: Judgment normal.       Neurologic Exam     Mental Status   Oriented to person, place, and time.   Attention: normal. Concentration: normal.   Speech: speech is normal   Level of consciousness: alert  Knowledge: good.     Cranial Nerves   Cranial nerves II through XII intact.     CN III, IV, VI   Pupils are equal, round, and reactive to light.    Motor Exam   Muscle bulk: normal  Overall muscle tone: normal  Right arm tone: normal  Left arm tone: normal  Right arm pronator drift: absent  Left arm pronator drift: absent  Right leg tone: normal  Left leg tone: normal    Strength   Right biceps: 5/5  Left biceps: 5/5  Right triceps: 5/5  Left  triceps: 5/5  Right quadriceps: 5/5  Left quadriceps: 5/5  Right hamstrin/5  Left hamstrin/5    Sensory Exam   Light touch normal.   Right leg pinprick: decreased from ankle  Left leg pinprick: decreased from ankle    Gait, Coordination, and Reflexes     Gait  Gait: normal    Coordination   Romberg: negative  Finger to nose coordination: normal    Tremor   Resting tremor: absent  Intention tremor: absent  Action tremor: absent    Reflexes   Right biceps: 2+  Left biceps: 2+  Right triceps: 2+  Left triceps: 2+  Right patellar: 2+  Left patellar: 2+  Right Emmanuel: absent  Left Emmanuel: absent     Assessment / Plan      Assessment/Plan:   Diagnoses and all orders for this visit:    1. Polyneuropathy (Primary)  -     EMG & Nerve Conduction Test; Future  -     pregabalin (Lyrica) 75 MG capsule; Take 1 capsule by mouth 2 (Two) Times a Day.  Dispense: 60 capsule; Refill: 2  -     CBC & Differential; Future  -     Comprehensive Metabolic Panel; Future  -     Vitamin B12; Future  -     Folate; Future  -     Methylmalonic Acid, Serum; Future  -     TSH; Future    2. Alcohol abuse  -     EMG & Nerve Conduction Test; Future  -     pregabalin (Lyrica) 75 MG capsule; Take 1 capsule by mouth 2 (Two) Times a Day.  Dispense: 60 capsule; Refill: 2  -     CBC & Differential; Future  -     Comprehensive Metabolic Panel; Future  -     Vitamin B12; Future  -     Folate; Future  -     Methylmalonic Acid, Serum; Future  -     TSH; Future         Follow Up:   Return in about 3 months (around 2023), or if symptoms worsen or fail to improve.    Anticipatory Guidance and Safety  Patient Education Provided  Labs: CBC, CMP, TSH, B12/Folate, MMA  [facility] #[id number]  CDA- obtained  Begin Lyrica 75 mg BID #60 with refills x2; SE reviewed  Note for therapist for controlled medication  EMG/NCS    RTC PRN or within 12 weeks or sooner with issues     Elizabeth Tiwari, DNP, APRN, FNP-C  [facility]

## 2023-10-04 ENCOUNTER — TELEPHONE (OUTPATIENT)
Dept: NEUROLOGY | Facility: CLINIC | Age: 33
End: 2023-10-04

## 2023-10-04 NOTE — TELEPHONE ENCOUNTER
Called and spoke to patient. Let her know that VIRGILIO Payton wants to discuss medication increase at her FU since she has been on Lyrica for a just few days. She verbalized understanding and had no further questions.

## 2023-10-04 NOTE — TELEPHONE ENCOUNTER
MEDICATION CONCERNS    Caller: Ruby Barakat    Relationship: Self    Best call back number: [phone]    Preferred pharmacy: [facility] [id number] [address] [phone] [facility] [phone] FX    What medications are you currently taking:   Current Outpatient Medications on File Prior to Visit   Medication Sig Dispense Refill    albuterol sulfate  (90 Base) MCG/ACT inhaler Inhale 2 puffs Every 4 (Four) Hours As Needed for Wheezing. 18 g 0    mometasone-formoterol (DULERA 100) 100-5 MCG/ACT inhaler Inhale 2 puffs 2 (Two) Times a Day. 1 each 0    Nirmatrelvir&Ritonavir 300/100 (PAXLOVID) 20 x 150 MG & 10 x 100MG tablet therapy pack tablet Take 3 tablets by mouth 2 (Two) Times a Day. 30 tablet 0    ondansetron ODT (ZOFRAN-ODT) 4 MG disintegrating tablet Place 1 tablet under the tongue Every 8 (Eight) Hours As Needed for Nausea or Vomiting. 9 tablet 0    predniSONE (DELTASONE) 20 MG tablet 3 po daily for 2 days, 2 po daily for 2 days, 1 po daily for 2 days 12 tablet 0    pregabalin (Lyrica) 75 MG capsule Take 1 capsule by mouth 2 (Two) Times a Day. 60 capsule 2     No current facility-administered medications on file prior to visit.     Which medication are you concerned about: pregabalin (Lyrica) 75 MG capsule- Take 1 capsule by mouth 2 (Two) Times a Day.    Who prescribed you this medication: VIRGILIO OSBORN    When did you start taking these medications: 9/29/2023    What are your concerns: PT IS WONDERING IF THE LYRICA DOSAGE OR FREQUENCY COULD BE INCREASED AS SHE HAS NOT SEEN MUCH BENEFIT WITH THE CURRENT DOSAGE/FREQUENCY; STILL EXPERIENCING THE NEUROPATHY AND NEUROPATHIC PAIN.    PLEASE REVIEW AND ADVISE.

## 2023-11-14 ENCOUNTER — TELEPHONE (OUTPATIENT)
Dept: NEUROLOGY | Facility: CLINIC | Age: 33
End: 2023-11-14

## 2023-11-14 NOTE — TELEPHONE ENCOUNTER
PT CALLED TO FOLLOW UP ON MESSAGE, INFORMED HER THAT WE CAN MOVE UP APPT. PT R/S TO SOONER APPT DATE AND TIME.

## 2023-11-14 NOTE — TELEPHONE ENCOUNTER
PATIENT CALLING TODAY TO ASK FOR LYRICA TO BE INCREASED.  SHE STATES SHE HAS PAIN BY THE TIME SHE IS HOME FROM WORK.    CURRENTLY TAKING 75 MG 2X DAY.    IF INCREASE IS WARRANTED, HER PHARMACY IS:    [facility] [id number] - [address] [phone] [facility] [phone]

## 2023-11-21 ENCOUNTER — OFFICE VISIT (OUTPATIENT)
Dept: NEUROLOGY | Facility: CLINIC | Age: 33
End: 2023-11-21
Payer: MEDICAID

## 2023-11-21 VITALS
DIASTOLIC BLOOD PRESSURE: 64 MMHG | WEIGHT: 153 LBS | SYSTOLIC BLOOD PRESSURE: 112 MMHG | RESPIRATION RATE: 14 BRPM | BODY MASS INDEX: 27.11 KG/M2 | OXYGEN SATURATION: 99 % | HEART RATE: 83 BPM | HEIGHT: 63 IN | TEMPERATURE: 98.2 F

## 2023-11-21 DIAGNOSIS — G62.9 POLYNEUROPATHY: Primary | ICD-10-CM

## 2023-11-21 DIAGNOSIS — F10.10 ALCOHOL ABUSE: ICD-10-CM

## 2023-11-21 PROCEDURE — 1160F RVW MEDS BY RX/DR IN RCRD: CPT | Performed by: NURSE PRACTITIONER

## 2023-11-21 PROCEDURE — 99214 OFFICE O/P EST MOD 30 MIN: CPT | Performed by: NURSE PRACTITIONER

## 2023-11-21 PROCEDURE — 1159F MED LIST DOCD IN RCRD: CPT | Performed by: NURSE PRACTITIONER

## 2023-11-21 RX ORDER — CARIPRAZINE 3 MG/1
3 CAPSULE, GELATIN COATED ORAL DAILY
COMMUNITY
Start: 2023-11-07

## 2023-11-21 RX ORDER — NALTREXONE 380 MG
380 KIT INTRAMUSCULAR
COMMUNITY
Start: 2023-10-24

## 2023-11-21 RX ORDER — PREGABALIN 150 MG/1
150 CAPSULE ORAL 2 TIMES DAILY
Qty: 60 CAPSULE | Refills: 2 | Status: SHIPPED | OUTPATIENT
Start: 2023-11-21

## 2023-11-21 RX ORDER — ERGOCALCIFEROL 1.25 MG/1
50000 CAPSULE ORAL
COMMUNITY
Start: 2023-10-24

## 2023-11-21 NOTE — PROGRESS NOTES
Follow Up Office Visit      Patient Name: Ruby Barakat  : 1990   MRN: [id number]     Chief Complaint:    Chief Complaint   Patient presents with    Follow-up     Polyneuropathy; patient would like to discuss medication dosages.        History of Present Illness: Ruby Barakat is a 33 y.o. female who is here today to follow up with Neurology for peripheral neuropathy. She was seen in clinic  (Adri). She is taking Lyrica 75 mg BID and would like to increase her dosing. Onset of neuropathy 2020.  She reports she has sharp burning pains and tingling numbness from the toes to bilateral shins .  Symptoms have improved as neuropathy used to extend the knees and was in hands.  She has stopped drinking alcohol and this has helped.  She is an established patient of counseling (driss Kim) for EtOH recovery counseling.  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 Would like to have some PRN muscle relaxer, like Flexeril for muscle spasms.    He has not gone for EMG NCS-pending scheduling.    SOCIAL: Single. Works FT at [employer]. Former . Moved to [place]  for rehabilitation for ETOH. 20 glasses of wine per day; sobriety date 22.      Recent Imaging:     MRI Brain W/WO  + Slight volume loss in the left hippocampus with slightly more prominent left temporal horn of the lateral ventricle relative to the right.  EEG  -contributory     Pertinent Medical History: Pituitary Tumor Removed , Mood Disorder, BMI > 30, ETOH Abuse      Subjective      Review of Systems:   Review of Systems   Constitutional: Negative.    HENT: Negative.     Eyes: Negative.    Respiratory: Negative.     Cardiovascular: Negative.    Gastrointestinal: Negative.    Endocrine: Negative.    Genitourinary: Negative.    Musculoskeletal: Negative.    Skin: Negative.    Allergic/Immunologic: Negative.    Neurological:  Positive for numbness. Negative for headache.    Hematological: Negative.    Psychiatric/Behavioral: Negative.     All other systems reviewed and are negative.      I have reviewed and the following portions of the patient's history were updated as appropriate: past family history, past medical history, past social history, past surgical history and problem list.    Medications:     Current Outpatient Medications:     ondansetron ODT (ZOFRAN-ODT) 4 MG disintegrating tablet, Place 1 tablet under the tongue Every 8 (Eight) Hours As Needed for Nausea or Vomiting., Disp: 9 tablet, Rfl: 0    pregabalin (LYRICA) 150 MG capsule, Take 1 capsule by mouth 2 (Two) Times a Day., Disp: 60 capsule, Rfl: 2    vitamin D (ERGOCALCIFEROL) 1.25 MG (84306 UT) capsule capsule, Take 1 capsule by mouth Every 7 (Seven) Days., Disp: , Rfl:     Vivitrol 380 MG reconstituted suspension IM suspension, Inject 380 mg into the appropriate muscle as directed by prescriber Every 30 (Thirty) Days., Disp: , Rfl:     Vraylar 3 MG capsule capsule, Take 1 capsule by mouth Daily., Disp: , Rfl:     albuterol sulfate  (90 Base) MCG/ACT inhaler, Inhale 2 puffs Every 4 (Four) Hours As Needed for Wheezing. (Patient not taking: Reported on 11/21/2023), Disp: 18 g, Rfl: 0    mometasone-formoterol (DULERA 100) 100-5 MCG/ACT inhaler, Inhale 2 puffs 2 (Two) Times a Day. (Patient not taking: Reported on 11/21/2023), Disp: 1 each, Rfl: 0    Nirmatrelvir&Ritonavir 300/100 (PAXLOVID) 20 x 150 MG & 10 x 100MG tablet therapy pack tablet, Take 3 tablets by mouth 2 (Two) Times a Day. (Patient not taking: Reported on 11/21/2023), Disp: 30 tablet, Rfl: 0    predniSONE (DELTASONE) 20 MG tablet, 3 po daily for 2 days, 2 po daily for 2 days, 1 po daily for 2 days (Patient not taking: Reported on 11/21/2023), Disp: 12 tablet, Rfl: 0    Allergies:   Allergies   Allergen Reactions    Demerol [Meperidine] Hives       Objective     Physical Exam:  Vital Signs:   Vitals:    11/21/23 1012   BP: 112/64   BP Location: Left  "arm   Patient Position: Sitting   Cuff Size: Adult   Pulse: 83   Resp: 14   Temp: 98.2 °F (36.8 °C)   TempSrc: Infrared   SpO2: 99%   Weight: 69.4 kg (153 lb)   Height: 160 cm (62.99\")   PainSc:   6   PainLoc: Leg     Body mass index is 27.11 kg/m².    Physical Exam  Vitals and nursing note reviewed.   Constitutional:       General: She is not in acute distress.     Appearance: Normal appearance. She is normal weight.   HENT:      Head: Normocephalic.      Nose: Nose normal.      Mouth/Throat:      Mouth: Mucous membranes are moist.      Pharynx: Oropharynx is clear.   Eyes:      Extraocular Movements: Extraocular movements intact.      Conjunctiva/sclera: Conjunctivae normal.      Pupils: Pupils are equal, round, and reactive to light.   Cardiovascular:      Rate and Rhythm: Normal rate and regular rhythm.      Pulses: Normal pulses.      Heart sounds: Normal heart sounds.   Pulmonary:      Effort: Pulmonary effort is normal.      Breath sounds: Normal breath sounds.   Musculoskeletal:         General: Normal range of motion.      Cervical back: Normal range of motion and neck supple. No rigidity.   Skin:     General: Skin is warm and dry.      Capillary Refill: Capillary refill takes less than 2 seconds.   Neurological:      General: No focal deficit present.      Mental Status: She is alert and oriented to person, place, and time.      Gait: Gait is intact.      Deep Tendon Reflexes:      Reflex Scores:       Tricep reflexes are 2+ on the right side and 2+ on the left side.       Bicep reflexes are 2+ on the right side and 2+ on the left side.       Patellar reflexes are 2+ on the right side and 2+ on the left side.  Psychiatric:         Mood and Affect: Mood normal.         Speech: Speech normal.         Behavior: Behavior normal.         Thought Content: Thought content normal.         Judgment: Judgment normal.         Neurologic Exam     Mental Status   Oriented to person, place, and time.   Speech: speech is " normal   Level of consciousness: alert  Knowledge: good.     Cranial Nerves     CN III, IV, VI   Pupils are equal, round, and reactive to light.    Sensory Exam   Right leg light touch: decreased from ankle  Left leg light touch: decreased from ankle    Gait, Coordination, and Reflexes     Gait  Gait: normal    Reflexes   Right biceps: 2+  Left biceps: 2+  Right triceps: 2+  Left triceps: 2+  Right patellar: 2+  Left patellar: 2+       Assessment / Plan      Assessment/Plan:   Diagnoses and all orders for this visit:    1. Polyneuropathy (Primary)  -     pregabalin (LYRICA) 150 MG capsule; Take 1 capsule by mouth 2 (Two) Times a Day.  Dispense: 60 capsule; Refill: 2    2. Alcohol abuse  -     pregabalin (LYRICA) 150 MG capsule; Take 1 capsule by mouth 2 (Two) Times a Day.  Dispense: 60 capsule; Refill: 2         Follow Up:   Return in about 3 months (around 2/21/2024), or if symptoms worsen or fail to improve.    Anticipatory Guidance and Safety Reviewed  Zhen Education Reviewed  Increase Lyrica to 150 mg BID #60; side effects reviewed  [facility] [phone]  CDA-on file  Keep NCS/EMG appointment  Continue EtOH cessation/abstinence    Return to care as needed or within 12 weeks    Elizabeth Tiwari, RAFFAELE, APRN, FNP-C  [facility] Neurology and Sleep Medicine

## 2024-03-04 ENCOUNTER — OFFICE VISIT (OUTPATIENT)
Dept: NEUROLOGY | Facility: CLINIC | Age: 34
End: 2024-03-04
Payer: MEDICAID

## 2024-03-04 VITALS
OXYGEN SATURATION: 99 % | TEMPERATURE: 97.3 F | RESPIRATION RATE: 14 BRPM | BODY MASS INDEX: 28.84 KG/M2 | HEIGHT: 63 IN | SYSTOLIC BLOOD PRESSURE: 104 MMHG | HEART RATE: 84 BPM | DIASTOLIC BLOOD PRESSURE: 62 MMHG | WEIGHT: 162.8 LBS

## 2024-03-04 DIAGNOSIS — G62.9 POLYNEUROPATHY: Primary | ICD-10-CM

## 2024-03-04 DIAGNOSIS — F10.10 ALCOHOL ABUSE: ICD-10-CM

## 2024-03-04 RX ORDER — PREGABALIN 300 MG/1
300 CAPSULE ORAL 2 TIMES DAILY
Qty: 60 CAPSULE | Refills: 2 | Status: SHIPPED | OUTPATIENT
Start: 2024-03-04

## 2024-03-04 RX ORDER — IBUPROFEN 600 MG/1
600 TABLET ORAL
COMMUNITY
Start: 2024-02-29 | End: 2024-03-30

## 2024-03-04 RX ORDER — LAMOTRIGINE 25 MG/1
25 TABLET ORAL DAILY
COMMUNITY
Start: 2024-02-29

## 2024-03-04 RX ORDER — LUMATEPERONE 42 MG/1
42 CAPSULE ORAL DAILY
COMMUNITY
Start: 2024-02-22

## 2024-03-04 RX ORDER — HYDROXYZINE PAMOATE 50 MG/1
50 CAPSULE ORAL DAILY
COMMUNITY
Start: 2024-02-29

## 2024-03-04 RX ORDER — CYCLOBENZAPRINE HCL 10 MG
10 TABLET ORAL DAILY PRN
Qty: 90 TABLET | Refills: 1 | Status: SHIPPED | OUTPATIENT
Start: 2024-03-04

## 2024-03-04 NOTE — PROGRESS NOTES
Follow Up Office Visit      Patient Name: Ruby Barakat  : 1990   MRN: [id number]     Chief Complaint:    Chief Complaint   Patient presents with    Follow-up     Neuropathy; it bothers her more frequently because she is working more.        History of Present Illness: Ruby Barakat is a 33 y.o. female who is here today to follow up with Neurology for PN. She was last seen in clinic  (Adri). She is taking Lyrica 150 mg BID with good tolerance and compliance; she feels medication dosing needs to be increased. Symptom onset PN with onset 2020. She co sharp burning pains and tingling numbness from toes to bilateral shins. ETOH cessation has helped. Needs refill on PRN muscle relaxer (Flexeril) for muscle spasms in legs. He has not gone for EMG NCS-pending scheduling.     Recent Imaging:     MRI Brain W/WO  + Slight volume loss in the left hippocampus with slightly more prominent left temporal horn of the lateral ventricle relative to the right.  EEG  -contributory     Pertinent Medical History: Pituitary Tumor Removed , Mood Disorder, BMI > 30, ETOH Abuse    Subjective      Review of Systems:   Review of Systems   Constitutional: Negative.    HENT: Negative.     Eyes: Negative.    Respiratory: Negative.     Cardiovascular: Negative.    Gastrointestinal: Negative.    Endocrine: Negative.    Genitourinary: Negative.    Musculoskeletal: Negative.    Skin: Negative.    Allergic/Immunologic: Negative.    Neurological:  Positive for numbness.   Hematological: Negative.    Psychiatric/Behavioral: Negative.     All other systems reviewed and are negative.      I have reviewed and the following portions of the patient's history were updated as appropriate: past family history, past medical history, past social history, past surgical history and problem list.    Medications:     Current Outpatient Medications:     Caplyta 42 MG capsule, Take 1 capsule by mouth Daily., Disp: , Rfl:      "hydrOXYzine pamoate (VISTARIL) 50 MG capsule, Take 1 capsule by mouth Daily., Disp: , Rfl:     ibuprofen (ADVIL,MOTRIN) 600 MG tablet, Take 1 tablet by mouth., Disp: , Rfl:     lamoTRIgine (LaMICtal) 25 MG tablet, Take 1 tablet by mouth Daily., Disp: , Rfl:     Levonorgestrel (MIRENA) 20 MCG/DAY intrauterine device IUD, 1 each by Intrauterine route., Disp: , Rfl:     pregabalin (LYRICA) 300 MG capsule, Take 1 capsule by mouth 2 (Two) Times a Day., Disp: 60 capsule, Rfl: 2    QUEtiapine (SEROquel) 100 MG tablet, Take 1 tablet by mouth., Disp: , Rfl:     vitamin D (ERGOCALCIFEROL) 1.25 MG (59614 UT) capsule capsule, Take 1 capsule by mouth Every 7 (Seven) Days., Disp: , Rfl:     Vivitrol 380 MG reconstituted suspension IM suspension, Inject 380 mg into the appropriate muscle as directed by prescriber Every 30 (Thirty) Days., Disp: , Rfl:     cyclobenzaprine (FLEXERIL) 10 MG tablet, Take 1 tablet by mouth Daily As Needed for Muscle Spasms., Disp: 90 tablet, Rfl: 1    Allergies:   Allergies   Allergen Reactions    Demerol [Meperidine] Hives       Objective     Physical Exam:  Vital Signs:   Vitals:    03/04/24 1012   BP: 104/62   BP Location: Right arm   Patient Position: Sitting   Cuff Size: Adult   Pulse: 84   Resp: 14   Temp: 97.3 °F (36.3 °C)   TempSrc: Infrared   SpO2: 99%   Weight: 73.8 kg (162 lb 12.8 oz)   Height: 160 cm (62.99\")   PainSc:   6   PainLoc: Leg     Body mass index is 28.85 kg/m².    Physical Exam  Vitals and nursing note reviewed.   Constitutional:       General: She is not in acute distress.     Appearance: Normal appearance. She is normal weight.   HENT:      Head: Normocephalic.      Nose: Nose normal.      Mouth/Throat:      Mouth: Mucous membranes are moist.      Pharynx: Oropharynx is clear.   Eyes:      Extraocular Movements: Extraocular movements intact.      Conjunctiva/sclera: Conjunctivae normal.   Musculoskeletal:      Cervical back: Normal range of motion and neck supple. No rigidity. "   Skin:     General: Skin is warm and dry.      Capillary Refill: Capillary refill takes less than 2 seconds.   Neurological:      Mental Status: She is alert and oriented to person, place, and time.      Sensory: Sensory deficit present.   Psychiatric:         Mood and Affect: Mood normal.         Behavior: Behavior normal.         Thought Content: Thought content normal.         Judgment: Judgment normal.         Neurologic Exam     Mental Status   Oriented to person, place, and time.        Assessment / Plan      Assessment/Plan:   Diagnoses and all orders for this visit:    1. Polyneuropathy (Primary)  -     pregabalin (LYRICA) 300 MG capsule; Take 1 capsule by mouth 2 (Two) Times a Day.  Dispense: 60 capsule; Refill: 2  -     cyclobenzaprine (FLEXERIL) 10 MG tablet; Take 1 tablet by mouth Daily As Needed for Muscle Spasms.  Dispense: 90 tablet; Refill: 1    2. Alcohol abuse  -     pregabalin (LYRICA) 300 MG capsule; Take 1 capsule by mouth 2 (Two) Times a Day.  Dispense: 60 capsule; Refill: 2  -     cyclobenzaprine (FLEXERIL) 10 MG tablet; Take 1 tablet by mouth Daily As Needed for Muscle Spasms.  Dispense: 90 tablet; Refill: 1         Follow Up:   Return in about 3 months (around 6/4/2024), or if symptoms worsen or fail to improve.    Anticipatory Guidance and Safety Reviewed  Zhen Education Reviewed  Increase Lyrica to 300 mg BID #60; side effects reviewed  [facility] #[id number]  CDA-on file  Keep NCS/EMG appointment  Continue EtOH cessation/abstinence     Return to care as needed or within 12 weeks    Elizabeth Tiwari, RAFFAELE, APRN, FNP-C  [facility] Neurology and Sleep Medicine

## 2024-04-26 ENCOUNTER — TELEPHONE (OUTPATIENT)
Dept: NEUROLOGY | Facility: CLINIC | Age: 34
End: 2024-04-26

## 2024-04-26 NOTE — TELEPHONE ENCOUNTER
Provider: EILZABETH COBLENTZ-KNUTESON    Caller: PATIENT    Relationship to Patient: SELF    Pharmacy: [facility] [id number]    Phone Number: [phone]    Reason for Call: STATES SHE LOST HER PURSE WITH SCRIPT OF PREGABALIN 300MG. DUE FOR REFILL ON 5/3/24. WOULD LIKE TO SEE IF SHE CAN GET A TEMP SUPPLY TO HOLD HER OVER UNTIL THE REFILL. PLEASE ADVISE, THANK YOU.

## 2024-04-29 NOTE — TELEPHONE ENCOUNTER
Provider: FAYE HARO APRN    Caller: TONY    Relationship to Patient: SELF    Phone Number: [phone]    Reason for Call: CALLING REGARDING THE MESSAGE.  STATED SHE HAS NOT HEARD FROM CLINIC.   STATED SHE IS OUT OF THE MEDICATION.  STATED HER PURSE WAS STOLEN WITH THE PRESCRIPTION IN IT.      When was the patient last seen: 3-4-24      PLEASE CALL & ADVISE

## 2024-06-04 ENCOUNTER — TELEPHONE (OUTPATIENT)
Dept: NEUROLOGY | Facility: CLINIC | Age: 34
End: 2024-06-04

## 2024-06-04 NOTE — TELEPHONE ENCOUNTER
Called and spoke to patient. She said she needs a refill of Lyrica and would like to talk to provider about seizures. I put her on the schedule for tomorrow morning to discuss with Yasmeen.

## 2024-06-04 NOTE — TELEPHONE ENCOUNTER
PATIENT WOULD LIKE TO SPEAK WITH SOMEONE ON PROVIDERS TEAM REGARDING HER CURRENT MEDICATION    PLEASE ADVISE PATIENT    THANK YOU

## 2024-06-05 NOTE — TELEPHONE ENCOUNTER
Called and spoke to Mag. The ER told her that her seizures were caused by stress and anxiety, but they were not able to write a script. She is requesting valium or another medication to help with stress/anxiety. She also needs a refill of lyrica. I scheduled her for 6/11/24 in [place].

## 2024-06-11 ENCOUNTER — OFFICE VISIT (OUTPATIENT)
Age: 34
End: 2024-06-11
Payer: MEDICAID

## 2024-06-11 VITALS
BODY MASS INDEX: 27.46 KG/M2 | DIASTOLIC BLOOD PRESSURE: 72 MMHG | OXYGEN SATURATION: 99 % | HEIGHT: 63 IN | TEMPERATURE: 97.8 F | SYSTOLIC BLOOD PRESSURE: 112 MMHG | WEIGHT: 155 LBS | RESPIRATION RATE: 14 BRPM | HEART RATE: 80 BPM

## 2024-06-11 DIAGNOSIS — G47.10 HYPERSOMNIA: ICD-10-CM

## 2024-06-11 DIAGNOSIS — R56.9 SEIZURE-LIKE ACTIVITY: ICD-10-CM

## 2024-06-11 DIAGNOSIS — F10.10 ALCOHOL ABUSE: ICD-10-CM

## 2024-06-11 DIAGNOSIS — F39 MOOD DISORDER: ICD-10-CM

## 2024-06-11 DIAGNOSIS — G62.9 POLYNEUROPATHY: Primary | ICD-10-CM

## 2024-06-11 PROCEDURE — 1159F MED LIST DOCD IN RCRD: CPT | Performed by: NURSE PRACTITIONER

## 2024-06-11 PROCEDURE — 99214 OFFICE O/P EST MOD 30 MIN: CPT | Performed by: NURSE PRACTITIONER

## 2024-06-11 PROCEDURE — 1160F RVW MEDS BY RX/DR IN RCRD: CPT | Performed by: NURSE PRACTITIONER

## 2024-06-11 RX ORDER — PREGABALIN 300 MG/1
300 CAPSULE ORAL 2 TIMES DAILY
Qty: 60 CAPSULE | Refills: 2 | Status: SHIPPED | OUTPATIENT
Start: 2024-06-11

## 2024-06-11 NOTE — PROGRESS NOTES
Follow Up Office Visit      Patient Name: Ruby Barakat  : 1990   MRN: [id number]     Chief Complaint:    Chief Complaint   Patient presents with   • Follow-up     Neuropathy  Last Monday pt reports she had a stressed induced seizure.        History of Present Illness: Ruby Barakat is a 33 y.o. female who is here today to follow up with Neurology for PN and Seizure like activity. She was last seen in clinic  (Adri). Patient arrived 20 minutes late for appt and this is an abbreviated encounter.     She has been taking Lyrica 300 mg BID and reports good tolerance and compliance with medication. She would like to increase medication dosing if possible. She repots having PN onset 2020. She co sharp burning pins and needles sensation to BUE that begins in the toes and radiates up to the shins. Not using Flexeril for leg cramps. ETOH cessation 23. She was not able to complete EMG/NCS as they have not contacted her for scheduling.     She does co hypersomnia with excessive AM fatigue and frequent night time awakening. Has not had sleep study.     She report she was seen at  ER on 24 for witnessed seizure activity and anxiety. Convulsions lasted 5-10 minutes and she was transported to ER with EMS. She reports having aura like sensation of feeling dizzy and spacey before waking up tired in ambulance. Previous seizure was 2-3 years ago with Wellbutrin use and ETOH. Previously was taking Lamictal for mood and has not been taking medication for some time. Established pt of counseling and is happy with this relationship. She would like a referral to psychiatry for benzodiazepines for her anxiety. Denies recent ETOH or recreational drug use. She drove to [place], [place] today for appt from [place], [place].     Recent Imaging:    EMG/NCS- pending scheduling   MRI Brain W/WO  + Slight volume loss in the left hippocampus with slightly more prominent left temporal horn of the lateral ventricle  relative to the right.  EEG 09/22 -contributory     Pertinent Medical History: Pituitary Tumor Removed 2005, Mood Disorder, BMI > 30, ETOH Abuse    Subjective      Review of Systems:   Review of Systems   Constitutional: Negative.    HENT: Negative.     Eyes: Negative.    Respiratory: Negative.     Cardiovascular: Negative.    Gastrointestinal: Negative.    Endocrine: Negative.    Genitourinary: Negative.    Musculoskeletal: Negative.    Skin: Negative.    Allergic/Immunologic: Negative.    Neurological:  Positive for seizures and numbness.   Hematological: Negative.    Psychiatric/Behavioral:  Negative for self-injury and suicidal ideas. The patient is nervous/anxious.    All other systems reviewed and are negative.      I have reviewed and the following portions of the patient's history were updated as appropriate: past family history, past medical history, past social history, past surgical history and problem list.    Medications:     Current Outpatient Medications:   •  Caplyta 42 MG capsule, Take 1 capsule by mouth Daily., Disp: , Rfl:   •  hydrOXYzine pamoate (VISTARIL) 50 MG capsule, Take 1 capsule by mouth Daily., Disp: , Rfl:   •  Levonorgestrel (MIRENA) 20 MCG/DAY intrauterine device IUD, To be inserted one time by prescriber. Route intrauterine., Disp: , Rfl:   •  pregabalin (LYRICA) 300 MG capsule, Take 1 capsule by mouth 2 (Two) Times a Day., Disp: 60 capsule, Rfl: 2  •  QUEtiapine (SEROquel) 100 MG tablet, Take 1 tablet by mouth., Disp: , Rfl:   •  vitamin D (ERGOCALCIFEROL) 1.25 MG (32203 UT) capsule capsule, Take 1 capsule by mouth Every 7 (Seven) Days., Disp: , Rfl:   •  Vivitrol 380 MG reconstituted suspension IM suspension, Inject 380 mg into the appropriate muscle as directed by prescriber Every 30 (Thirty) Days., Disp: , Rfl:     Allergies:   Allergies   Allergen Reactions   • Demerol [Meperidine] Hives       Objective     Physical Exam:  Vital Signs:   Vitals:    06/11/24 0920   BP: 112/72  "  BP Location: Left arm   Patient Position: Sitting   Cuff Size: Adult   Pulse: 80   Resp: 14   Temp: 97.8 °F (36.6 °C)   TempSrc: Infrared   SpO2: 99%   Weight: 70.3 kg (155 lb)   Height: 160 cm (62.99\")   PainSc:   5   PainLoc: Leg     Body mass index is 27.46 kg/m².    Physical Exam  Vitals and nursing note reviewed.   Constitutional:       General: She is not in acute distress.     Appearance: Normal appearance.   HENT:      Head: Normocephalic.      Nose: Nose normal.      Mouth/Throat:      Mouth: Mucous membranes are moist.      Pharynx: Oropharynx is clear.   Eyes:      Extraocular Movements: Extraocular movements intact.      Conjunctiva/sclera: Conjunctivae normal.   Musculoskeletal:      Cervical back: Normal range of motion and neck supple.   Skin:     General: Skin is warm and dry.      Capillary Refill: Capillary refill takes less than 2 seconds.   Neurological:      General: No focal deficit present.      Mental Status: She is alert and oriented to person, place, and time.      Cranial Nerves: Cranial nerves 2-12 are intact. No cranial nerve deficit.      Sensory: No sensory deficit.      Motor: No weakness.      Coordination: Coordination normal.      Gait: Gait is intact. Gait normal.      Deep Tendon Reflexes: Reflexes normal.   Psychiatric:         Mood and Affect: Mood normal.         Speech: Speech normal.         Behavior: Behavior normal.         Neurologic Exam     Mental Status   Oriented to person, place, and time.   Speech: speech is normal   Level of consciousness: alert  Knowledge: good.     Cranial Nerves   Cranial nerves II through XII intact.     Motor Exam   Muscle bulk: normal  Overall muscle tone: normal    Gait, Coordination, and Reflexes     Gait  Gait: normal    Tremor   Resting tremor: absent       Assessment / Plan      Assessment/Plan:   Diagnoses and all orders for this visit:    1. Polyneuropathy (Primary)  -     pregabalin (LYRICA) 300 MG capsule; Take 1 capsule by mouth 2 " (Two) Times a Day.  Dispense: 60 capsule; Refill: 2    2. Alcohol abuse  -     pregabalin (LYRICA) 300 MG capsule; Take 1 capsule by mouth 2 (Two) Times a Day.  Dispense: 60 capsule; Refill: 2  -     Ambulatory Referral to Psychiatry    3. Seizure-like activity  -     Ambulatory Referral to Neurology    4. Hypersomnia  -     Home Sleep Study; Future    5. Mood disorder  -     Ambulatory Referral to Psychiatry         Follow Up:   Return in about 3 months (around 9/11/2024), or if symptoms worsen or fail to improve.    Anticipatory Guidance and Safety Reviewed  Patient Education Provided  Keep EMG/NCS appt- provided with phone number to return central scheduling contacts  DC Flexeril   Continue Lyrica 300 mg BID; SE reviewed  [facility] # [id number]  CDA- on file   Home Sleep Study r/o SARITA   Psychiatry Referral for Mood Disorder/ETOH  Declined pt request of PRN Benzodiazepines at this time  Neurology Referral for EMU with EEG r/o seizure activity  Seizure Precautions reviewed: avoid driving 90 days following seizure activity, to avoid working in positions of great physical height, to avoid hot tubs/ swimming pools and bath tubs unsupervised, and to call 911/ go to ER with onset of seizure activity > 5 min    RTC PRN or within 12 weeks or sooner with issues     Elizabeth Tiwari, DNP, APRN, FNP-C  [facility] Neurology and Sleep Medicine

## 2024-07-02 ENCOUNTER — TELEPHONE (OUTPATIENT)
Dept: NEUROLOGY | Facility: CLINIC | Age: 34
End: 2024-07-02

## 2024-07-02 NOTE — TELEPHONE ENCOUNTER
Caller: Ruby Barakat    Relationship: Self    Best call back number: [phone] (home)   What was the call regarding: PATIENT JUST RECEIVED NOTICE OF HER APPT TIME FOR TOMORROW TO GET HER HOME SLEEP TEST SUPPLIES BUT SHE DID NOT REALIZE IT WAS IN [place]. SHE STATES SHE LIVES IN [place] AND WANTS TO KNOW IF IT IS TOO LATE TO HAVE IT TRANSFERRED TO A [facility] LOCATION FOR SUPPLIES .     PLEASE ADVISE  THANK YOU

## 2024-07-02 NOTE — TELEPHONE ENCOUNTER
CALLED AND SPOKE TO TONY TO LET HER KNOW SHE WOULD NEED TO CALL CENTRAL SCHEDULING. SHE SAID SHE WILL GO TO APPT TOMORROW AND NOT RESCHEDULE.

## 2024-08-23 ENCOUNTER — TELEPHONE (OUTPATIENT)
Dept: NEUROLOGY | Facility: CLINIC | Age: 34
End: 2024-08-23

## 2024-09-03 ENCOUNTER — TELEMEDICINE (OUTPATIENT)
Dept: NEUROLOGY | Facility: CLINIC | Age: 34
End: 2024-09-03
Payer: COMMERCIAL

## 2024-09-03 DIAGNOSIS — G62.9 POLYNEUROPATHY: Primary | ICD-10-CM

## 2024-09-03 DIAGNOSIS — F10.10 ALCOHOL ABUSE: ICD-10-CM

## 2024-09-03 DIAGNOSIS — R56.9 SEIZURE-LIKE ACTIVITY: ICD-10-CM

## 2024-09-03 DIAGNOSIS — G47.10 HYPERSOMNIA: ICD-10-CM

## 2024-09-03 RX ORDER — PREGABALIN 300 MG/1
300 CAPSULE ORAL 2 TIMES DAILY
Qty: 60 CAPSULE | Refills: 2 | Status: SHIPPED | OUTPATIENT
Start: 2024-09-03

## 2024-09-03 NOTE — PROGRESS NOTES
Follow Up Office Visit      Patient Name: Ruby Barakat  : 1990   MRN: [id number]     Chief Complaint:    Chief Complaint   Patient presents with    Seizures    Peripheral Neuropathy       History of Present Illness: Ruby Barakat is a 34 y.o. female who is here today to follow up with Neurology for PN and Seizure. She was last seen in clinic  (Adri). This is a telehealth encounter.     She was referred to Epileptologist (Blossom) and was evaluated . EMU visit is scheduled for . She was also referred to psychiatry for mood disorder and sent for a home sleep study, which she has not completed. She continues to co hypersomnia with excessive AM fatigue and frequent night time awakening. She has been taking Lyrica 300 mg BID and reports good tolerance and compliance with medication therapy. She denies seizure activitiy; last seizure 24.     Recent Imaging:    Home Sleep Study- pending scheduling    EMG/NCS-  not completed   MRI Brain W/WO  + Slight volume loss in the left hippocampus with slightly more prominent left temporal horn of the lateral ventricle relative to the right.  EEG  -contributory     Pertinent Medical History: Pituitary Tumor Removed , Mood Disorder, BMI > 30, ETOH Abuse    Subjective      Review of Systems:   Review of Systems   Constitutional:  Positive for fatigue.   HENT: Negative.     Eyes: Negative.    Respiratory: Negative.     Cardiovascular: Negative.    Gastrointestinal: Negative.    Endocrine: Negative.    Genitourinary: Negative.    Musculoskeletal: Negative.    Skin: Negative.    Allergic/Immunologic: Negative.    Neurological:  Positive for seizures and numbness.   Hematological: Negative.    Psychiatric/Behavioral:  Positive for sleep disturbance.    All other systems reviewed and are negative.      I have reviewed and the following portions of the patient's history were updated as appropriate: past family history, past medical  history, past social history, past surgical history and problem list.    Medications:     Current Outpatient Medications:     pregabalin (LYRICA) 300 MG capsule, Take 1 capsule by mouth 2 (Two) Times a Day., Disp: 60 capsule, Rfl: 2    Caplyta 42 MG capsule, Take 1 capsule by mouth Daily., Disp: , Rfl:     hydrOXYzine pamoate (VISTARIL) 50 MG capsule, Take 1 capsule by mouth Daily., Disp: , Rfl:     Levonorgestrel (MIRENA) 20 MCG/DAY intrauterine device IUD, To be inserted one time by prescriber. Route intrauterine., Disp: , Rfl:     QUEtiapine (SEROquel) 100 MG tablet, Take 1 tablet by mouth., Disp: , Rfl:     vitamin D (ERGOCALCIFEROL) 1.25 MG (11819 UT) capsule capsule, Take 1 capsule by mouth Every 7 (Seven) Days., Disp: , Rfl:     Vivitrol 380 MG reconstituted suspension IM suspension, Inject 380 mg into the appropriate muscle as directed by prescriber Every 30 (Thirty) Days., Disp: , Rfl:     Allergies:   Allergies   Allergen Reactions    Demerol [Meperidine] Hives       Objective     Physical Exam:  Vital Signs: There were no vitals filed for this visit.  There is no height or weight on file to calculate BMI.    Physical Exam  Vitals and nursing note reviewed.   Constitutional:       General: She is not in acute distress.     Appearance: Normal appearance.   HENT:      Head: Normocephalic.      Nose: Nose normal.      Mouth/Throat:      Mouth: Mucous membranes are moist.      Pharynx: Oropharynx is clear.   Eyes:      Extraocular Movements: Extraocular movements intact.      Conjunctiva/sclera: Conjunctivae normal.   Musculoskeletal:      Cervical back: Normal range of motion.   Neurological:      Mental Status: She is alert and oriented to person, place, and time.      Sensory: Sensory deficit present.   Psychiatric:         Mood and Affect: Mood normal.         Speech: Speech normal.         Behavior: Behavior normal.         Neurologic Exam     Mental Status   Oriented to person, place, and time.   Speech:  speech is normal   Level of consciousness: alert  Knowledge: good.        Assessment / Plan      Assessment/Plan:   Diagnoses and all orders for this visit:    1. Polyneuropathy (Primary)  -     pregabalin (LYRICA) 300 MG capsule; Take 1 capsule by mouth 2 (Two) Times a Day.  Dispense: 60 capsule; Refill: 2    2. Hypersomnia  -     Home Sleep Study; Future    3. Seizure-like activity    4. Alcohol abuse  -     pregabalin (LYRICA) 300 MG capsule; Take 1 capsule by mouth 2 (Two) Times a Day.  Dispense: 60 capsule; Refill: 2         Follow Up:   Return in about 8 weeks (around 10/29/2024), or if symptoms worsen or fail to improve.    Anticipatory Guidance and Safety Reviewed  Patient Education Provided  Continue Lyrica 300 mg BID; SE reviewed  [facility] # [id number]  CDA- on file   Home Sleep Study r/o SARITA - will reorder  Keep Neurology Appt for EMU with EEG r/o seizure activity  Seizure Precautions reviewed: avoid driving 90 days following seizure activity, to avoid working in positions of great physical height, to avoid hot tubs/ swimming pools and bath tubs unsupervised, and to call 911/ go to ER with onset of seizure activity > 5 min     RTC PRN or within 8 weeks or sooner with issues     Twilio Encounter for 21 min with > 50% of encounter spent counseling and coordinating care       Elizabeth Tiwari, DNP, APRN, FNP-C  [facility]

## 2024-11-04 ENCOUNTER — TELEPHONE (OUTPATIENT)
Dept: NEUROLOGY | Facility: CLINIC | Age: 34
End: 2024-11-04

## 2024-11-04 NOTE — TELEPHONE ENCOUNTER
PT REQUESTING 11/6/24 OV BE CHANGED TO TEL-A-HEALTH VISIT    PLEASE ADVISE PT VIA [employer]    THANK YOU

## 2024-11-06 ENCOUNTER — TELEMEDICINE (OUTPATIENT)
Dept: NEUROLOGY | Facility: CLINIC | Age: 34
End: 2024-11-06
Payer: COMMERCIAL

## 2024-11-06 DIAGNOSIS — G47.10 HYPERSOMNIA: ICD-10-CM

## 2024-11-06 DIAGNOSIS — R56.9 SEIZURE-LIKE ACTIVITY: Primary | ICD-10-CM

## 2024-11-06 DIAGNOSIS — G62.9 POLYNEUROPATHY: ICD-10-CM

## 2024-11-06 PROCEDURE — 99214 OFFICE O/P EST MOD 30 MIN: CPT | Performed by: NURSE PRACTITIONER

## 2024-11-06 RX ORDER — PREGABALIN 300 MG/1
300 CAPSULE ORAL 2 TIMES DAILY
Qty: 60 CAPSULE | Refills: 5 | Status: SHIPPED | OUTPATIENT
Start: 2024-11-06

## 2024-11-06 RX ORDER — LAMOTRIGINE 200 MG/1
1 TABLET, EXTENDED RELEASE ORAL DAILY
COMMUNITY

## 2024-11-06 RX ORDER — ALPRAZOLAM 1 MG/1
1 TABLET ORAL DAILY PRN
COMMUNITY

## 2024-11-06 RX ORDER — PRAZOSIN HYDROCHLORIDE 2 MG/1
2 CAPSULE ORAL NIGHTLY
COMMUNITY

## 2024-11-06 RX ORDER — DEXTROAMPHETAMINE SACCHARATE, AMPHETAMINE ASPARTATE, DEXTROAMPHETAMINE SULFATE AND AMPHETAMINE SULFATE 7.5; 7.5; 7.5; 7.5 MG/1; MG/1; MG/1; MG/1
1 TABLET ORAL EVERY 12 HOURS SCHEDULED
COMMUNITY
Start: 2024-10-03

## 2024-11-06 NOTE — PROGRESS NOTES
Follow Up Office Visit      Patient Name: Ruby Barakat  : 1990   MRN: [id number]     Chief Complaint:    Chief Complaint   Patient presents with    Peripheral Neuropathy    Seizures    Sleeping Problem       History of Present Illness: Ruby Barakat is a 34 y.o. female who is here today to follow up with neurology for peripheral neuropathy and seizure.  She was last seen in clinic  (Adri).  This is a telehealth encounter.    She was sent for home sleep study and she did not keep her appt. Continues to have hypersomnia. +excessive AM fatigue and frequent night time awakening     She has been taking Lyrica 300 mg twice daily and reports good tolerance and compliance with medication therapy for paresthesia.  She was previously ordered EMG NCS and she did not keep appointment and does not want to. She co sharp burning pins and needles sensation to BUE that begins in the toes and radiates up to the shins. ETOH cessation 23.     Last seizure: Mana 3 2024. She was referred to board-certified epileptologist (GEORGE) and did not keep appointment for EMU, but was elevated ; she has not been started on AED. She plans to FU with him for EMU.     Recent Imaging:     Home Sleep Study- pending scheduling    EMG/NCS-  not completed   MRI Brain W/WO  + Slight volume loss in the left hippocampus with slightly more prominent left temporal horn of the lateral ventricle relative to the right.  EEG  -contributory     Pertinent Medical History: Pituitary Tumor Removed , Mood Disorder, BMI > 30, ETOH Abuse  Subjective      Review of Systems:   Review of Systems   Constitutional:  Positive for fatigue.   Neurological:  Positive for numbness. Negative for seizures.   Psychiatric/Behavioral:  Positive for sleep disturbance.        I have reviewed and the following portions of the patient's history were updated as appropriate: past family history, past medical history, past social history, past  surgical history and problem list.    Medications:     Current Outpatient Medications:     amphetamine-dextroamphetamine (ADDERALL) 30 MG tablet, Take 1 tablet by mouth Every 12 (Twelve) Hours., Disp: , Rfl:     Cariprazine HCl (Vraylar) 1.5 MG capsule capsule, Take 1 capsule by mouth Daily., Disp: , Rfl:     lamoTRIgine  MG tablet sustained-release 24 hour, Take 200 mg by mouth Daily., Disp: , Rfl:     Levonorgestrel (MIRENA) 20 MCG/DAY intrauterine device IUD, To be inserted one time by prescriber. Route intrauterine., Disp: , Rfl:     prazosin (MINIPRESS) 2 MG capsule, Take 1 capsule by mouth Every Night., Disp: , Rfl:     pregabalin (LYRICA) 300 MG capsule, Take 1 capsule by mouth 2 (Two) Times a Day., Disp: 60 capsule, Rfl: 5    QUEtiapine (SEROquel) 100 MG tablet, Take 1 tablet by mouth., Disp: , Rfl:     ALPRAZolam (XANAX) 1 MG tablet, Take 1 tablet by mouth Daily As Needed. for anxiety (Patient not taking: Reported on 11/6/2024), Disp: , Rfl:     Caplyta 42 MG capsule, Take 1 capsule by mouth Daily. (Patient not taking: Reported on 11/6/2024), Disp: , Rfl:     hydrOXYzine pamoate (VISTARIL) 50 MG capsule, Take 1 capsule by mouth Daily. (Patient not taking: Reported on 11/6/2024), Disp: , Rfl:     vitamin D (ERGOCALCIFEROL) 1.25 MG (34457 UT) capsule capsule, Take 1 capsule by mouth Every 7 (Seven) Days. (Patient not taking: Reported on 11/6/2024), Disp: , Rfl:     Vivitrol 380 MG reconstituted suspension IM suspension, Inject 380 mg into the appropriate muscle as directed by prescriber Every 30 (Thirty) Days. (Patient not taking: Reported on 11/6/2024), Disp: , Rfl:     Allergies:   Allergies   Allergen Reactions    Demerol [Meperidine] Hives       Objective     Physical Exam:  Vital Signs: There were no vitals filed for this visit.  There is no height or weight on file to calculate BMI.    Physical Exam  Vitals and nursing note reviewed.   Constitutional:       General: She is not in acute distress.      Appearance: Normal appearance.   HENT:      Head: Normocephalic.      Nose: Nose normal.      Mouth/Throat:      Mouth: Mucous membranes are moist.      Pharynx: Oropharynx is clear.   Eyes:      Extraocular Movements: Extraocular movements intact.      Conjunctiva/sclera: Conjunctivae normal.   Musculoskeletal:      Cervical back: Normal range of motion.   Neurological:      Mental Status: She is alert and oriented to person, place, and time.   Psychiatric:         Mood and Affect: Mood normal.         Behavior: Behavior normal.         Neurological Exam  Mental Status  Alert. Oriented to person, place, and time.    Cranial Nerves  CN III, IV, VI: Extraocular movements intact bilaterally.       Assessment / Plan      Assessment/Plan:   Diagnoses and all orders for this visit:    1. Seizure-like activity (Primary)    2. Hypersomnia  -     Home Sleep Study; Future    3. Polyneuropathy  -     pregabalin (LYRICA) 300 MG capsule; Take 1 capsule by mouth 2 (Two) Times a Day.  Dispense: 60 capsule; Refill: 5         Follow Up:   Return in about 3 months (around 2/6/2025), or if symptoms worsen or fail to improve.    Anticipatory Guidance and Safety Reviewed  Patient Education Provided  Home Sleep Study for Hypersomnia   Continue Lyrica 300 mg BID; SE reviewed  Cancel EMG/NCS per pt request   [facility] # [id number] reviewed and appropriate  CDA- on file   Seizure Precautions reviewed: avoid driving 90 days following seizure activity, to avoid working in positions of great physical height, to avoid hot tubs/ swimming pools and bath tubs unsupervised, and to call 911/ go to ER with onset of seizure activity > 5 min     RTC PRN or within 12 or sooner with issues      Twilio Encounter for 21 min with > 50% of encounter spent counseling and coordinating care     Elizabeth Tiwari, DNP, APRN, FNP-C  [facility]

## 2024-11-07 ENCOUNTER — TELEPHONE (OUTPATIENT)
Dept: NEUROLOGY | Facility: CLINIC | Age: 34
End: 2024-11-07
Payer: COMMERCIAL

## 2024-11-07 NOTE — TELEPHONE ENCOUNTER
Provider: FAYE HARO APRN     Caller: TONY    Relationship to Patient: SELF    Phone Number: [phone]    Reason for Call: CALLING REGARDING THE PRESCRIPTION FOR PREGABALIN.   STATED THE PHARMACY TOLD HER IT NEEDS A PA.      When was the patient last seen: 11-6-24    PLEASE CALL & ADVISE

## 2024-11-08 NOTE — TELEPHONE ENCOUNTER
Caller: Ruby Barakat    Relationship: Self    Best call back number: [phone]    What was the call regarding: PT CALLING TO CHECK THE STATUS OF HER LYRICA PRIOR AUTHORIZATION.    Do you require a callback: YES, PLEASE.    Is it okay if the provider responds through [employer]?: YES    PLEASE REVIEW AND ADVISE.

## 2024-12-19 ENCOUNTER — TELEPHONE (OUTPATIENT)
Dept: NEUROLOGY | Facility: CLINIC | Age: 34
End: 2024-12-19

## 2024-12-19 NOTE — TELEPHONE ENCOUNTER
Caller: Ruby Barakat    Relationship: Self    Best call back number: [phone]    What form or medical record are you requesting: LETTER FOR COURT    Who is requesting this form or medical record from you: [facility] COURT    How would you like to receive the form or medical records (pick-up, mail, fax): MYCHART      Timeframe paperwork needed: BY TOMORROW MORNING    Additional notes: PATIENT NEEDS LETTER STATING THAT SHE IS PRESCRIBED LYRICA BY US, WHEN THAT STARTED, AND WHAT IT IS FOR. PLEASE CALL PATIENT WHEN LETTER IS READY

## 2025-01-30 ENCOUNTER — TELEMEDICINE (OUTPATIENT)
Dept: NEUROLOGY | Facility: CLINIC | Age: 35
End: 2025-01-30
Payer: COMMERCIAL

## 2025-01-30 DIAGNOSIS — Z00.00 WELL WOMAN EXAM (NO GYNECOLOGICAL EXAM): ICD-10-CM

## 2025-01-30 DIAGNOSIS — R56.9 SEIZURE-LIKE ACTIVITY: Primary | ICD-10-CM

## 2025-01-30 DIAGNOSIS — G62.9 POLYNEUROPATHY: ICD-10-CM

## 2025-01-30 DIAGNOSIS — G47.10 HYPERSOMNIA: ICD-10-CM

## 2025-01-30 RX ORDER — PREGABALIN 300 MG/1
300 CAPSULE ORAL 2 TIMES DAILY
Qty: 60 CAPSULE | Refills: 5 | Status: SHIPPED | OUTPATIENT
Start: 2025-01-30

## 2025-01-30 RX ORDER — LISDEXAMFETAMINE DIMESYLATE 40 MG/1
1 CAPSULE ORAL DAILY
COMMUNITY
Start: 2025-01-27

## 2025-01-30 NOTE — PROGRESS NOTES
Follow Up Office Visit      Patient Name: Ruby Barakat  : 1990   MRN: [id number]     Chief Complaint:    Chief Complaint   Patient presents with    Follow-up     Seizure; pt reports last seizure was summer 2024.  Hypersomnia; pt reports she is taking Seroquel for sleep and it is helpful.  Neuropathy       History of Present Illness: Ruby Barakat is a 34 y.o. female who is here today to follow up with neurology for seizure like activity, hypersomnia and PN. She was last seen in clinic  (Adri). This is a telehealth encounter.     She reports continues hypersomnia. She was ordered a Sleep Study and she has not completed this; she is requesting this be rescheduled. She fell asleep driving on Seroquel and got a DUI.     She has been taking Lyrica 300 mg BID and reports good tolerance and compliance. She feels medication is controlling her symptoms of burning stabbing pain has improved and PN is mostly in the feet and not the legs. + daily foot checks,     Last seizure: Mana 3 2024. She was referred to board-certified epileptologist (GEORGE) and did not keep appointment for EMU, but was elevated ; she has not been started on AED. She has noticed her seizures occur when she has not been eating well and wonders if its related to hypoglycemia. She does not have her EMU appt scheduled at this time and is considering testing for now. She is driving.     She is requesting GYN provider for annual pap and contraception.    Recent Imaging:     Home Sleep Study- pending scheduling    EMG/NCS-  not completed   MRI Brain W/WO  + Slight volume loss in the left hippocampus with slightly more prominent left temporal horn of the lateral ventricle relative to the right.  EEG  -contributory     Pertinent Medical History: Pituitary Tumor Removed , Mood Disorder, BMI > 30, ETOH Abuse    Subjective      Review of Systems:   Review of Systems   Constitutional: Negative.    HENT: Negative.     Eyes:  Negative.    Respiratory: Negative.     Cardiovascular: Negative.    Gastrointestinal: Negative.    Endocrine: Negative.    Genitourinary: Negative.    Musculoskeletal: Negative.    Skin: Negative.    Allergic/Immunologic: Negative.    Neurological:  Positive for numbness. Negative for seizures.   Hematological: Negative.    Psychiatric/Behavioral: Negative.  Positive for sleep disturbance.    All other systems reviewed and are negative.      I have reviewed and the following portions of the patient's history were updated as appropriate: past family history, past medical history, past social history, past surgical history and problem list.    Medications:     Current Outpatient Medications:     ALPRAZolam (XANAX) 1 MG tablet, Take 1 tablet by mouth Daily As Needed. for anxiety, Disp: , Rfl:     amphetamine-dextroamphetamine (ADDERALL) 30 MG tablet, Take 1 tablet by mouth Every 12 (Twelve) Hours., Disp: , Rfl:     Caplyta 42 MG capsule, Take 1 capsule by mouth Daily., Disp: , Rfl:     Cariprazine HCl (Vraylar) 1.5 MG capsule capsule, Take 3 capsules by mouth Daily., Disp: , Rfl:     lamoTRIgine  MG tablet sustained-release 24 hour, Take 200 mg by mouth Daily., Disp: , Rfl:     Levonorgestrel (MIRENA) 20 MCG/DAY intrauterine device IUD, To be inserted one time by prescriber. Route intrauterine., Disp: , Rfl:     lisdexamfetamine (VYVANSE) 40 MG capsule, Take 1 capsule by mouth Daily, Disp: , Rfl:     prazosin (MINIPRESS) 2 MG capsule, Take 5 mg by mouth Every Night., Disp: , Rfl:     pregabalin (LYRICA) 300 MG capsule, Take 1 capsule by mouth 2 (Two) Times a Day., Disp: 60 capsule, Rfl: 5    QUEtiapine (SEROquel) 100 MG tablet, Take 1 tablet by mouth., Disp: , Rfl:     hydrOXYzine pamoate (VISTARIL) 50 MG capsule, Take 1 capsule by mouth Daily. (Patient not taking: Reported on 1/30/2025), Disp: , Rfl:     vitamin D (ERGOCALCIFEROL) 1.25 MG (25415 UT) capsule capsule, Take 1 capsule by mouth Every 7 (Seven) Days.  (Patient not taking: Reported on 1/30/2025), Disp: , Rfl:     Vivitrol 380 MG reconstituted suspension IM suspension, Inject 380 mg into the appropriate muscle as directed by prescriber Every 30 (Thirty) Days. (Patient not taking: Reported on 1/30/2025), Disp: , Rfl:     Allergies:   Allergies   Allergen Reactions    Demerol [Meperidine] Hives       Objective     Physical Exam:  Vital Signs: There were no vitals filed for this visit.  There is no height or weight on file to calculate BMI.    Physical Exam  Vitals and nursing note reviewed.   Constitutional:       General: She is not in acute distress.     Appearance: Normal appearance.   HENT:      Head: Normocephalic.      Nose: Nose normal.      Mouth/Throat:      Mouth: Mucous membranes are moist.      Pharynx: Oropharynx is clear.   Eyes:      Extraocular Movements: Extraocular movements intact.      Conjunctiva/sclera: Conjunctivae normal.   Musculoskeletal:      Cervical back: Normal range of motion.   Neurological:      Mental Status: She is alert and oriented to person, place, and time.   Psychiatric:         Mood and Affect: Mood normal.         Behavior: Behavior normal.         Neurological Exam  Mental Status  Alert. Oriented to person, place, and time.    Cranial Nerves  CN III, IV, VI: Extraocular movements intact bilaterally.       Assessment / Plan      Assessment/Plan:   Diagnoses and all orders for this visit:    1. Seizure-like activity (Primary)    2. Polyneuropathy  -     pregabalin (LYRICA) 300 MG capsule; Take 1 capsule by mouth 2 (Two) Times a Day.  Dispense: 60 capsule; Refill: 5    3. Hypersomnia    4. Well woman exam (no gynecological exam)  -     Ambulatory Referral to Gynecologic Urology         Follow Up:   Return in about 6 months (around 7/30/2025), or if symptoms worsen or fail to improve.    Anticipatory Guidance and Safety Reviewed  Patient Education Provided  Continue Lyrica 300 mg BID; SE reviewed  Home Sleep Study- she was  provided with contact information to reschedule  [facility] #[id number] reviewed and appropriate  CDA- on file   Seizure Precautions reviewed: avoid driving 90 days following seizure activity, to avoid working in positions of great physical height, to avoid hot tubs/ swimming pools and bath tubs unsupervised, and to call 911/ go to ER with onset of seizure activity > 5 min  Keep FU with [facility] otologist for EMU-she was provided with contact information to reschedule  GYN referral for women's wellness per pt request     RTC PRN or within 6 months or sooner with issues      Twilio Encounter for 21 min with > 50% of encounter spent counseling and coordinating care     Elizabeth Tiwari, DNP, APRN, FNP-C  [facility]

## 2025-07-29 ENCOUNTER — TELEPHONE (OUTPATIENT)
Dept: NEUROLOGY | Facility: CLINIC | Age: 35
End: 2025-07-29
Payer: COMMERCIAL

## 2025-07-29 NOTE — TELEPHONE ENCOUNTER
Caller: Ruby Barakat    Relationship: Self    Best call back number: [phone]    What is the best time to reach you: ALL DAY OR TOMORROW MONRNING    What was the call regarding: PT WAS CALLING IN TO ASK IF THEY CAN SWITCH THEIR APPT TOMORROW TO TELEHEALTH    PLEASE REVIEW AND ADVISE

## 2025-07-30 NOTE — TELEPHONE ENCOUNTER
ATTEMPTED TO CONTACT PATIENT- NO ANSWER AND MAILBOX IS FULL. SHE NEEDS TO BE SEEN IN PERSON TODAY SINCE SHE HASN'T BEEN SEEN IN OFFICE IN A YEAR.    Hub okay to relay message.

## 2025-07-30 NOTE — TELEPHONE ENCOUNTER
Caller: Ruby Barakat    Relationship: Self    Best call back number: [phone]    What was the call regarding?: PT CALLED BACK AND STATES SHE IS UNABLE TO MAKE IT FOR IN-OFFICE VISIT TODAY (7/30/25), THEREFORE, SHE HAS BEEN RESCHEDULED FOR IN-OFFICE VISIT ON 8/5/25 @ 3:30PM.    DOCUMENTING PER PROT[employer]OL.

## 2025-08-05 ENCOUNTER — OFFICE VISIT (OUTPATIENT)
Dept: NEUROLOGY | Facility: CLINIC | Age: 35
End: 2025-08-05
Payer: COMMERCIAL

## 2025-08-05 VITALS
OXYGEN SATURATION: 100 % | DIASTOLIC BLOOD PRESSURE: 72 MMHG | BODY MASS INDEX: 27.42 KG/M2 | HEART RATE: 86 BPM | HEIGHT: 62 IN | RESPIRATION RATE: 14 BRPM | WEIGHT: 149 LBS | SYSTOLIC BLOOD PRESSURE: 98 MMHG | TEMPERATURE: 97.3 F

## 2025-08-05 DIAGNOSIS — G40.909 SEIZURE DISORDER: Primary | ICD-10-CM

## 2025-08-05 DIAGNOSIS — G62.9 POLYNEUROPATHY: ICD-10-CM

## 2025-08-05 DIAGNOSIS — G47.10 HYPERSOMNIA: ICD-10-CM

## 2025-08-05 PROCEDURE — 99214 OFFICE O/P EST MOD 30 MIN: CPT | Performed by: NURSE PRACTITIONER

## 2025-08-06 RX ORDER — CYCLOBENZAPRINE HCL 10 MG
10 TABLET ORAL DAILY PRN
Qty: 30 TABLET | Refills: 1 | Status: SHIPPED | OUTPATIENT
Start: 2025-08-06

## 2025-08-06 RX ORDER — PREGABALIN 300 MG/1
300 CAPSULE ORAL 2 TIMES DAILY
Qty: 60 CAPSULE | Refills: 5 | Status: SHIPPED | OUTPATIENT
Start: 2025-08-06

## 2025-08-06 RX ORDER — LAMOTRIGINE 200 MG/1
1 TABLET, EXTENDED RELEASE ORAL DAILY
Qty: 90 TABLET | Refills: 1 | Status: SHIPPED | OUTPATIENT
Start: 2025-08-06